# Patient Record
Sex: MALE | Race: WHITE | NOT HISPANIC OR LATINO | Employment: FULL TIME | URBAN - METROPOLITAN AREA
[De-identification: names, ages, dates, MRNs, and addresses within clinical notes are randomized per-mention and may not be internally consistent; named-entity substitution may affect disease eponyms.]

---

## 2017-05-12 ENCOUNTER — COMMUNICATION - RIVER FALLS (OUTPATIENT)
Dept: FAMILY MEDICINE | Facility: CLINIC | Age: 57
End: 2017-05-12

## 2017-05-12 ENCOUNTER — OFFICE VISIT - RIVER FALLS (OUTPATIENT)
Dept: FAMILY MEDICINE | Facility: CLINIC | Age: 57
End: 2017-05-12

## 2017-05-12 ASSESSMENT — MIFFLIN-ST. JEOR: SCORE: 2189.27

## 2017-05-13 LAB
CHOLEST SERPL-MCNC: 202 MG/DL (ref 125–200)
CHOLEST/HDLC SERPL: 5.2 {RATIO}
GLUCOSE BLD-MCNC: 141 MG/DL (ref 65–99)
HBA1C MFR BLD: 7.6 %
HDLC SERPL-MCNC: 39 MG/DL
LDLC SERPL CALC-MCNC: 105 MG/DL
NONHDLC SERPL-MCNC: 163 MG/DL
TRIGL SERPL-MCNC: 291 MG/DL

## 2018-07-11 ENCOUNTER — OFFICE VISIT - RIVER FALLS (OUTPATIENT)
Dept: FAMILY MEDICINE | Facility: CLINIC | Age: 58
End: 2018-07-11

## 2018-07-11 ASSESSMENT — MIFFLIN-ST. JEOR: SCORE: 2062.27

## 2018-07-12 LAB
CHOLEST SERPL-MCNC: 229 MG/DL
CHOLEST/HDLC SERPL: 6 {RATIO}
CREAT SERPL-MCNC: 1 MG/DL (ref 0.7–1.33)
GLUCOSE BLD-MCNC: 252 MG/DL (ref 65–99)
HBA1C MFR BLD: 12.1 %
HDLC SERPL-MCNC: 38 MG/DL
LDLC SERPL CALC-MCNC: 106 MG/DL
LDLC SERPL CALC-MCNC: ABNORMAL MG/DL
NONHDLC SERPL-MCNC: 191 MG/DL
TRIGL SERPL-MCNC: 541 MG/DL

## 2018-08-13 ENCOUNTER — OFFICE VISIT - RIVER FALLS (OUTPATIENT)
Dept: FAMILY MEDICINE | Facility: CLINIC | Age: 58
End: 2018-08-13

## 2018-08-13 ASSESSMENT — MIFFLIN-ST. JEOR: SCORE: 2090.39

## 2018-09-12 ENCOUNTER — OFFICE VISIT - RIVER FALLS (OUTPATIENT)
Dept: FAMILY MEDICINE | Facility: CLINIC | Age: 58
End: 2018-09-12

## 2018-09-13 LAB
CREAT SERPL-MCNC: 1 MG/DL (ref 0.7–1.33)
GLUCOSE BLD-MCNC: 119 MG/DL (ref 65–99)
HBA1C MFR BLD: 7.6 %

## 2018-12-13 ENCOUNTER — OFFICE VISIT - RIVER FALLS (OUTPATIENT)
Dept: FAMILY MEDICINE | Facility: CLINIC | Age: 58
End: 2018-12-13

## 2018-12-13 ASSESSMENT — MIFFLIN-ST. JEOR: SCORE: 1987.88

## 2018-12-14 LAB — HBA1C MFR BLD: 6 %

## 2019-06-04 ENCOUNTER — OFFICE VISIT - RIVER FALLS (OUTPATIENT)
Dept: FAMILY MEDICINE | Facility: CLINIC | Age: 59
End: 2019-06-04

## 2019-06-04 ASSESSMENT — MIFFLIN-ST. JEOR: SCORE: 1995.13

## 2019-06-05 ENCOUNTER — COMMUNICATION - RIVER FALLS (OUTPATIENT)
Dept: FAMILY MEDICINE | Facility: CLINIC | Age: 59
End: 2019-06-05

## 2019-06-05 LAB — HBA1C MFR BLD: 5.8 %

## 2019-12-28 ENCOUNTER — OFFICE VISIT - RIVER FALLS (OUTPATIENT)
Dept: FAMILY MEDICINE | Facility: CLINIC | Age: 59
End: 2019-12-28

## 2019-12-28 ASSESSMENT — MIFFLIN-ST. JEOR: SCORE: 2061.36

## 2020-02-20 ENCOUNTER — COMMUNICATION - RIVER FALLS (OUTPATIENT)
Dept: FAMILY MEDICINE | Facility: CLINIC | Age: 60
End: 2020-02-20

## 2020-02-26 ENCOUNTER — OFFICE VISIT - RIVER FALLS (OUTPATIENT)
Dept: FAMILY MEDICINE | Facility: CLINIC | Age: 60
End: 2020-02-26

## 2020-02-26 ASSESSMENT — MIFFLIN-ST. JEOR: SCORE: 2055.01

## 2020-02-27 ENCOUNTER — COMMUNICATION - RIVER FALLS (OUTPATIENT)
Dept: FAMILY MEDICINE | Facility: CLINIC | Age: 60
End: 2020-02-27

## 2020-02-27 LAB
BUN SERPL-MCNC: 14 MG/DL (ref 7–25)
BUN/CREAT RATIO - HISTORICAL: NORMAL (ref 6–22)
CALCIUM SERPL-MCNC: 9.4 MG/DL (ref 8.6–10.3)
CHLORIDE BLD-SCNC: 103 MMOL/L (ref 98–110)
CHOLEST SERPL-MCNC: 177 MG/DL
CHOLEST/HDLC SERPL: 3.8 {RATIO}
CO2 SERPL-SCNC: 29 MMOL/L (ref 20–32)
CREAT SERPL-MCNC: 1.09 MG/DL (ref 0.7–1.33)
CREAT UR-MCNC: 147 MG/DL (ref 20–320)
EGFRCR SERPLBLD CKD-EPI 2021: 74 ML/MIN/1.73M2
GLUCOSE BLD-MCNC: 99 MG/DL (ref 65–99)
HBA1C MFR BLD: 6.1 %
HDLC SERPL-MCNC: 46 MG/DL
LDLC SERPL CALC-MCNC: 99 MG/DL
MICROALBUMIN UR-MCNC: 0.8 MG/DL
MICROALBUMIN/CREAT UR: 5 MG/G{CREAT}
NONHDLC SERPL-MCNC: 131 MG/DL
POTASSIUM BLD-SCNC: 4 MMOL/L (ref 3.5–5.3)
SODIUM SERPL-SCNC: 140 MMOL/L (ref 135–146)
TRIGL SERPL-MCNC: 208 MG/DL

## 2020-03-03 ENCOUNTER — COMMUNICATION - RIVER FALLS (OUTPATIENT)
Dept: FAMILY MEDICINE | Facility: CLINIC | Age: 60
End: 2020-03-03

## 2020-08-14 ENCOUNTER — OFFICE VISIT - RIVER FALLS (OUTPATIENT)
Dept: FAMILY MEDICINE | Facility: CLINIC | Age: 60
End: 2020-08-14

## 2020-08-14 ASSESSMENT — MIFFLIN-ST. JEOR: SCORE: 2083.58

## 2020-08-15 LAB — HBA1C MFR BLD: 6.1 %

## 2020-08-16 ENCOUNTER — COMMUNICATION - RIVER FALLS (OUTPATIENT)
Dept: FAMILY MEDICINE | Facility: CLINIC | Age: 60
End: 2020-08-16

## 2021-02-18 ENCOUNTER — OFFICE VISIT - RIVER FALLS (OUTPATIENT)
Dept: FAMILY MEDICINE | Facility: CLINIC | Age: 61
End: 2021-02-18

## 2021-02-18 ENCOUNTER — AMBULATORY - RIVER FALLS (OUTPATIENT)
Dept: FAMILY MEDICINE | Facility: CLINIC | Age: 61
End: 2021-02-18

## 2021-02-18 ASSESSMENT — MIFFLIN-ST. JEOR: SCORE: 2144.82

## 2021-02-19 ENCOUNTER — COMMUNICATION - RIVER FALLS (OUTPATIENT)
Dept: FAMILY MEDICINE | Facility: CLINIC | Age: 61
End: 2021-02-19

## 2021-02-19 LAB
BUN SERPL-MCNC: 16 MG/DL (ref 7–25)
BUN/CREAT RATIO - HISTORICAL: ABNORMAL (ref 6–22)
CALCIUM SERPL-MCNC: 9.4 MG/DL (ref 8.6–10.3)
CHLORIDE BLD-SCNC: 106 MMOL/L (ref 98–110)
CHOLEST SERPL-MCNC: 177 MG/DL
CHOLEST/HDLC SERPL: 3.6 {RATIO}
CO2 SERPL-SCNC: 25 MMOL/L (ref 20–32)
CREAT SERPL-MCNC: 1.13 MG/DL (ref 0.7–1.25)
CREAT UR-MCNC: 159 MG/DL (ref 20–320)
EGFRCR SERPLBLD CKD-EPI 2021: 70 ML/MIN/1.73M2
GLUCOSE BLD-MCNC: 141 MG/DL (ref 65–99)
HBA1C MFR BLD: 6.7 %
HDLC SERPL-MCNC: 49 MG/DL
LDLC SERPL CALC-MCNC: 102 MG/DL
MICROALBUMIN UR-MCNC: 0.8 MG/DL
MICROALBUMIN/CREAT UR: 5 MG/G{CREAT}
NONHDLC SERPL-MCNC: 128 MG/DL
POTASSIUM BLD-SCNC: 4.3 MMOL/L (ref 3.5–5.3)
SODIUM SERPL-SCNC: 141 MMOL/L (ref 135–146)
TRIGL SERPL-MCNC: 164 MG/DL

## 2021-12-21 ENCOUNTER — OFFICE VISIT - RIVER FALLS (OUTPATIENT)
Dept: FAMILY MEDICINE | Facility: CLINIC | Age: 61
End: 2021-12-21

## 2021-12-22 ENCOUNTER — COMMUNICATION - RIVER FALLS (OUTPATIENT)
Dept: FAMILY MEDICINE | Facility: CLINIC | Age: 61
End: 2021-12-22

## 2021-12-22 LAB
BUN SERPL-MCNC: 16 MG/DL (ref 7–25)
BUN/CREAT RATIO - HISTORICAL: ABNORMAL (ref 6–22)
CALCIUM SERPL-MCNC: 9.6 MG/DL (ref 8.6–10.3)
CHLORIDE BLD-SCNC: 104 MMOL/L (ref 98–110)
CHOLEST SERPL-MCNC: 193 MG/DL
CHOLEST/HDLC SERPL: 4.2 {RATIO}
CO2 SERPL-SCNC: 27 MMOL/L (ref 20–32)
CREAT SERPL-MCNC: 1.12 MG/DL (ref 0.7–1.25)
CREAT UR-MCNC: 168 MG/DL (ref 20–320)
EGFRCR SERPLBLD CKD-EPI 2021: 71 ML/MIN/1.73M2
GLUCOSE BLD-MCNC: 135 MG/DL (ref 65–99)
HBA1C MFR BLD: 6.7 %
HDLC SERPL-MCNC: 46 MG/DL
LDLC SERPL CALC-MCNC: 107 MG/DL
MICROALBUMIN UR-MCNC: 0.9 MG/DL
MICROALBUMIN/CREAT UR: 5 MG/G{CREAT}
NONHDLC SERPL-MCNC: 147 MG/DL
POTASSIUM BLD-SCNC: 4.5 MMOL/L (ref 3.5–5.3)
SODIUM SERPL-SCNC: 141 MMOL/L (ref 135–146)
TRIGL SERPL-MCNC: 281 MG/DL
VIT B12 SERPL-MCNC: 317 PG/ML (ref 200–1100)

## 2022-01-01 ENCOUNTER — TRANSFERRED RECORDS (OUTPATIENT)
Dept: MULTI SPECIALTY CLINIC | Facility: CLINIC | Age: 62
End: 2022-01-01

## 2022-01-01 LAB — RETINOPATHY: NORMAL

## 2022-02-11 VITALS
DIASTOLIC BLOOD PRESSURE: 81 MMHG | TEMPERATURE: 97.9 F | HEIGHT: 76 IN | HEART RATE: 76 BPM | WEIGHT: 259.5 LBS | SYSTOLIC BLOOD PRESSURE: 119 MMHG | BODY MASS INDEX: 31.6 KG/M2

## 2022-02-12 VITALS
TEMPERATURE: 97.5 F | BODY MASS INDEX: 31.78 KG/M2 | DIASTOLIC BLOOD PRESSURE: 82 MMHG | DIASTOLIC BLOOD PRESSURE: 84 MMHG | WEIGHT: 261 LBS | WEIGHT: 254.8 LBS | SYSTOLIC BLOOD PRESSURE: 135 MMHG | HEART RATE: 84 BPM | BODY MASS INDEX: 31.03 KG/M2 | HEIGHT: 76 IN | SYSTOLIC BLOOD PRESSURE: 120 MMHG | HEIGHT: 76 IN

## 2022-02-12 VITALS
TEMPERATURE: 97.6 F | WEIGHT: 273 LBS | HEART RATE: 75 BPM | SYSTOLIC BLOOD PRESSURE: 122 MMHG | DIASTOLIC BLOOD PRESSURE: 77 MMHG | HEIGHT: 76 IN | BODY MASS INDEX: 33.24 KG/M2

## 2022-02-12 VITALS
SYSTOLIC BLOOD PRESSURE: 120 MMHG | HEIGHT: 76 IN | WEIGHT: 254.6 LBS | BODY MASS INDEX: 31 KG/M2 | TEMPERATURE: 97.7 F | DIASTOLIC BLOOD PRESSURE: 68 MMHG | OXYGEN SATURATION: 93 % | HEART RATE: 84 BPM

## 2022-02-12 VITALS
HEIGHT: 76 IN | BODY MASS INDEX: 34.44 KG/M2 | SYSTOLIC BLOOD PRESSURE: 133 MMHG | TEMPERATURE: 98.2 F | DIASTOLIC BLOOD PRESSURE: 84 MMHG | WEIGHT: 282.8 LBS | HEART RATE: 76 BPM

## 2022-02-12 VITALS
WEIGHT: 240 LBS | SYSTOLIC BLOOD PRESSURE: 124 MMHG | HEART RATE: 76 BPM | BODY MASS INDEX: 29.22 KG/M2 | DIASTOLIC BLOOD PRESSURE: 78 MMHG | TEMPERATURE: 98.1 F | HEIGHT: 76 IN

## 2022-02-12 VITALS
DIASTOLIC BLOOD PRESSURE: 70 MMHG | SYSTOLIC BLOOD PRESSURE: 120 MMHG | BODY MASS INDEX: 33.23 KG/M2 | TEMPERATURE: 97.9 F | WEIGHT: 273 LBS | HEART RATE: 80 BPM

## 2022-02-12 VITALS
TEMPERATURE: 97.6 F | WEIGHT: 238.4 LBS | SYSTOLIC BLOOD PRESSURE: 124 MMHG | HEIGHT: 76 IN | BODY MASS INDEX: 29.03 KG/M2 | DIASTOLIC BLOOD PRESSURE: 77 MMHG | HEART RATE: 76 BPM

## 2022-02-12 VITALS
HEART RATE: 80 BPM | BODY MASS INDEX: 30.83 KG/M2 | HEIGHT: 76 IN | DIASTOLIC BLOOD PRESSURE: 82 MMHG | WEIGHT: 253.2 LBS | TEMPERATURE: 97.2 F | SYSTOLIC BLOOD PRESSURE: 135 MMHG

## 2022-02-12 VITALS
TEMPERATURE: 97.3 F | HEART RATE: 79 BPM | DIASTOLIC BLOOD PRESSURE: 79 MMHG | SYSTOLIC BLOOD PRESSURE: 136 MMHG | BODY MASS INDEX: 31.26 KG/M2 | WEIGHT: 256.8 LBS

## 2022-02-15 NOTE — NURSING NOTE
Comprehensive Intake Entered On:  6/4/2019 4:47 PM CDT    Performed On:  6/4/2019 4:46 PM CDT by Sandra Nice               Summary   Chief Complaint :   DM/HTN check    Weight Measured :   240.0 lb(Converted to: 240 lb 0 oz, 108.86 kg)    Height Measured :   76 in(Converted to: 6 ft 4 in, 193.04 cm)    Body Mass Index :   29.21 kg/m2 (HI)    Body Surface Area :   2.41 m2   Systolic Blood Pressure :   124 mmHg   Diastolic Blood Pressure :   78 mmHg   Mean Arterial Pressure :   93 mmHg   Peripheral Pulse Rate :   76 bpm   BP Method :   Electronic   HR Method :   Electronic   Temperature Tympanic :   98.1 DegF(Converted to: 36.7 DegC)    Sandra Nice - 6/4/2019 4:46 PM CDT   Health Status   Allergies Verified? :   Yes   Medication History Verified? :   Yes   Pre-Visit Planning Status :   Completed   Tobacco Use? :   Never smoker   Sandra Nice - 6/4/2019 4:46 PM CDT   Meds / Allergies   (As Of: 6/4/2019 4:47:15 PM CDT)   Allergies (Active)   Dymatap  Estimated Onset Date:   Unspecified ; Reactions:   skin ulcer ; Created By:   Sandra Juan CMA; Reaction Status:   Active ; Category:   Drug ; Substance:   Dymatap ; Type:   Allergy ; Updated By:   Sandra Juan CMA; Reviewed Date:   9/12/2018 8:04 AM CDT        Medication List   (As Of: 6/4/2019 4:47:15 PM CDT)   Prescription/Discharge Order    atorvastatin  :   atorvastatin ; Status:   Prescribed ; Ordered As Mnemonic:   atorvastatin 80 mg oral tablet ; Simple Display Line:   80 mg, 1 tab(s), po, daily, 90 tab(s), 0 Refill(s) ; Ordering Provider:   Marcus Wolff MD; Catalog Code:   atorvastatin ; Order Dt/Tm:   5/31/2019 11:45:22 AM          losartan  :   losartan ; Status:   Prescribed ; Ordered As Mnemonic:   losartan 25 mg oral tablet ; Simple Display Line:   25 mg, 1 tab(s), PO, Daily, 90 tab(s), 0 Refill(s) ; Ordering Provider:   Marcus Wolff MD; Catalog Code:   losartan ; Order Dt/Tm:   5/31/2019 11:45:21 AM          metFORMIN  :    metFORMIN ; Status:   Prescribed ; Ordered As Mnemonic:   metFORMIN 500 mg oral tablet ; Simple Display Line:   500 mg, 1 tab(s), PO, BID, 180 tab(s), 0 Refill(s) ; Ordering Provider:   Marcus Wolff MD; Catalog Code:   metFORMIN ; Order Dt/Tm:   5/31/2019 11:45:21 AM          Miscellaneous Rx Supply  :   Miscellaneous Rx Supply ; Status:   Prescribed ; Ordered As Mnemonic:   one touch verio test strips ; Simple Display Line:   See Instructions, Test 3x/ day, 300 EA, 3 Refill(s) ; Ordering Provider:   Marcus Wolff MD; Catalog Code:   Miscellaneous Rx Supply ; Order Dt/Tm:   12/13/2018 8:33:34 AM          Miscellaneous Rx Supply  :   Miscellaneous Rx Supply ; Status:   Completed ; Ordered As Mnemonic:   CPAP Supplies ; Simple Display Line:   See Instructions, heated humidifier, heated tubing, filters, nasal or full face mask of choice with headgear.  Replacement cushions and supplies as needed.   Months=99 (lifetime), 1 EA, 0 Refill(s) ; Ordering Provider:   Marcus Wolff MD; Catalog Code:   Miscellaneous Rx Supply ; Order Dt/Tm:   12/13/2018 8:28:41 AM

## 2022-02-15 NOTE — LETTER
(Inserted Image. Unable to display)   February 27, 2020      ROSA MAY       [address] [place], [place] [id number]        Dear ROSA,    Thank you for selecting [facility] for your healthcare needs.  Below you will find the results of the recent tests done at our clinic.     All labs acceptable.      Result Name Current Result Previous Result Reference Range   Potassium Level (mmol/L)  4.0 2/26/2020  4.2 9/12/2018 3.5 - 5.3   Glucose Level (mg/dL)  99 2/26/2020 ((H)) 119 9/12/2018 65 - 99   Creatinine Level (mg/dL)  1.09 2/26/2020  1.00 9/12/2018 0.70 - 1.33   Hgb A1c ((H)) 6.1 2/26/2020 ((H)) 5.8 6/4/2019  - <5.7   Cholesterol (mg/dL)  177 2/26/2020 ((H)) 229 7/11/2018  - <200   HDL (mg/dL)  46 2/26/2020 ((L)) 38 7/11/2018 > OR = 40 -    LDL  99 2/26/2020  See comment 7/11/2018    Triglyceride (mg/dL) ((H)) 208 2/26/2020 ((H)) 541 7/11/2018  - <150   Ur Microalbumin/Creatinine Ratio  5 2/26/2020  14 7/11/2018  - <30       Please contact me or my assistant at [phone] if you have any questions.     Sincerely,        Marcus Wolff MD        What do your labs mean?  Below is a glossary of commonly ordered labs:  LDL   Bad Cholesterol   HDL   Good Cholesterol  AST/ALT   Liver Function   Cr/Creatinine   Kidney Function  Microalbumin   Kidney Function  BUN   Kidney Function  PSA   Prostate    TSH   Thyroid Hormone  HgbA1c   Diabetes Test   Hgb (Hemoglobin)   Red Blood Cells

## 2022-02-15 NOTE — PROGRESS NOTES
Patient:   ROSA MAY            MRN: [id number]            FIN: [id number]               Age:   58 years     Sex:  Male     :  1960   Associated Diagnoses:   Dyslipidemia; HTN (hypertension); Type 2 diabetes mellitus   Author:   Marcus Wolff MD      Visit Information      Date of Service: 2018 07:39 am  Performing Location: [facility]  Encounter#: [id number]      Primary Care Provider (PCP):  Marcus Wolff MD    NPI# [id number]      Chief Complaint   2018 7:51 AM CDT    DM check up.        History of Present Illness   Doing well  Tolerating meds well  Ave glucose 127  needs eye check      Review of Systems   Constitutional:  Negative.    Eye:  Negative.    Ear/Nose/Mouth/Throat:  Negative.    Respiratory:  Negative.    Cardiovascular:  Negative.    Gastrointestinal:  Negative.    Genitourinary:  Negative.    Hematology/Lymphatics:  Negative.    Endocrine:  Negative.    Immunologic:  Negative.    Musculoskeletal:  Negative.    Integumentary:  Negative.    Neurologic:  Negative.       Health Status   Allergies:    Allergic Reactions (Selected)  Severity Not Documented  Dymatap (Skin ulcer)   Medications:  (Selected)   Prescriptions  Prescribed  CPAP Supplies: CPAP Supplies, See Instructions, Instructions: heated humidifier, heated tubing, filters, nasal or full face mask of choice with headgear.  Replacement cushions and supplies as needed.   Months=99 (lifetime), Supply, # 1 EA, 0 Refill(s), Type: Mainten...  atorvastatin 80 mg oral tablet: 1 tab(s) ( 80 mg ), po, daily, # 90 tab(s), 1 Refill(s), Type: Maintenance, Pharmacy: [facility], 1 tab(s) Oral daily  losartan 25 mg oral tablet: 1 tab(s) ( 25 mg ), PO, Daily, # 90 tab(s), 0 Refill(s), Type: Maintenance, Pharmacy: [facility], 1 tab(s) Oral daily  metFORMIN 500 mg oral tablet: 1 tab(s) ( 500 mg ), PO, BID, # 180 tab(s), 1 Refill(s), Type: Maintenance, [facility]: [facility], 1 tab(s) Oral bid  one touch verio test strips: one touch verio test strips, See Instructions, Instructions: Test 3x/ day, Supply, # 300 EA, 3 Refill(s), Type: Maintenance, Pharmacy: [facility], Test 3x/ day,    Medications          *denotes recorded medication          CPAP Supplies: See Instructions, heated humidifier, heated tubing, filters, nasal or full face mask of choice with headgear.  Replacement cushions and supplies as needed.   Months=99 (lifetime), 1 EA, 0 Refill(s).          one touch verio test strips: See Instructions, Test 3x/ day, 300 EA, 3 Refill(s).          atorvastatin 80 mg oral tablet: 80 mg, 1 tab(s), po, daily, 90 tab(s), 1 Refill(s).          losartan 25 mg oral tablet: 25 mg, 1 tab(s), PO, Daily, 90 tab(s), 0 Refill(s).          metFORMIN 500 mg oral tablet: 500 mg, 1 tab(s), PO, BID, 180 tab(s), 1 Refill(s).     Problem list:    All Problems (Selected)  Dyslipidemia / SNOMED CT 0646191587 / Confirmed  Gout / SNOMED CT 115017304 / Confirmed  HTN (hypertension) / SNOMED CT 2355607238 / Confirmed  Obesity / SNOMED CT 4013789303 / Probable  Seasonal allergies / SNOMED CT 455346644 / Confirmed  Type 2 diabetes mellitus / SNOMED CT 680713074 / Confirmed      Histories   Past Medical History:    Active  Seasonal allergies ([id number])   Family History:    Diabetes mellitus type I  Son (Daniel)  Stroke  Mother (Mariela)  Autism  Son (Daniel)  Bladder cancer..  Father (Thierno)     Procedure history:    No active procedure history items have been selected or recorded.   Social History:        Alcohol Assessment            Current, 6 beers per year      Tobacco Assessment            Never      Substance Abuse Assessment            Never      Employment and Education Assessment            Employed, Work/School description: Maintance Supervisor.      Home and Environment Assessment            Marital status: .  Spouse/Partner name: Rhoda Kendall.       Nutrition and Health Assessment            Type of diet: Regular.      Sexual Assessment            Sexually active: Yes.  Identifies as male, Sexual orientation: Straight or heterosexual.        Physical Examination   Vital Signs   9/12/2018 7:51 AM CDT Temperature Tympanic 97.3 DegF  LOW    Peripheral Pulse Rate 79 bpm    HR Method Electronic    Systolic Blood Pressure 136 mmHg  HI    Diastolic Blood Pressure 79 mmHg    Mean Arterial Pressure 98 mmHg    BP Method Electronic      Measurements from flowsheet : Measurements   9/12/2018 7:51 AM CDT    Weight Measured - Standard                256.8 lb     General:  Alert and oriented, No acute distress.    Neck:  Supple.    Respiratory:  Respirations are non-labored.    Cardiovascular:  Normal rate, Regular rhythm.    Neurologic:  Alert, Oriented.       Health Maintenance      Recommendations     Pending (in the next year)        OverDue           Alcohol Misuse Screen (Male) due  05/02/15  and every 1  year(s)           Depression Screen (Male) due  05/12/18  and every 1  year(s)        Due            Aspirin Therapy for Prevention of CVD (Male) due  09/12/18  and every 5  year(s)           Colorectal Cancer Screen (Colonoscopy) (Male) due  09/12/18  Variable frequency           Colorectal Cancer Screen (Occult Blood) (Male) due  09/12/18  Variable frequency           Colorectal Cancer Screen (Sigmoidoscopy) (Male) due  09/12/18  Variable frequency           DM - Communication with Managing Provider due  09/12/18  and every 1  year(s)           DM - Eye Exam due  09/12/18  and every 1  year(s)           Hepatitis C Screen [id number] (Male) due  09/12/18  One-time only           Influenza Vaccine due  09/12/18  and every 1  year(s)           Lung Cancer Screen (Male) due  09/12/18  and every 1  year(s)        Near Due            DM - HgbA1c near due  10/11/18  and every 3  month(s)        Due In Future            Tobacco Use Screen (Male) not due until  07/11/19  and  every 1  year(s)           DM - Microalbumin not due until  07/11/19  and every 1  year(s)           Lipid Disorders Screen (Male) not due until  07/11/19  and every 1  year(s)           DM - Foot Exam not due until  07/11/19  and every 1  year(s)           Body Mass Index Check (Male) not due until  08/13/19  and every 1  year(s)           High Blood Pressure Screen (Male) not due until  08/13/19  and every 1  year(s)           Obesity Screen and Counseling (Male) not due until  08/13/19  and every 1  year(s)     Satisfied (in the past 1 year)        Satisfied            Body Mass Index Check (Male) on  08/13/18.           Body Mass Index Check (Male) on  07/11/18.           DM - Foot Exam on  07/11/18.           DM - HgbA1c on  07/11/18.           DM - Microalbumin on  07/11/18.           DM - Microalbumin on  07/11/18.           High Blood Pressure Screen (Male) on  08/13/18.           High Blood Pressure Screen (Male) on  07/11/18.           Lipid Disorders Screen (Male) on  07/11/18.           Lipid Disorders Screen (Male) on  07/11/18.           Lipid Disorders Screen (Male) on  07/11/18.           Lipid Disorders Screen (Male) on  07/11/18.           Lipid Disorders Screen (Male) on  07/11/18.           Obesity Screen and Counseling (Male) on  08/13/18.           Obesity Screen and Counseling (Male) on  07/11/18.           Tobacco Use Screen (Male) on  07/11/18.        Review / Management   Results review:  Lab results   7/11/2018 8:46 AM CDT Sodium Level 138 mmol/L    Potassium Level 4.4 mmol/L    Chloride Level 101 mmol/L    CO2 Level 28 mmol/L    Glucose Level 252 mg/dL  HI    BUN 13 mg/dL    Creatinine Level 1.00 mg/dL    BUN/Creat Ratio NOT APPLICABLE    eGFR 83 mL/min/1.73m2    eGFR African American 96 mL/min/1.73m2    Calcium Level 9.3 mg/dL    Hgb A1c 12.1  HI    Cholesterol 229 mg/dL  HI    Non-HDL Cholesterol 191  HI    HDL 38 mg/dL  LOW    Cholesterol/HDL Ratio 6.0  HI    LDL See comment    LDL  Direct 106 mg/dL  HI    Triglyceride 541 mg/dL  HI    U Microalbumin 3.6 mg/dL    Ur Creatinine 264 mg/dL    Ur Microalbumin/Creatinine Ratio 14   .       Impression and Plan   Diagnosis     Dyslipidemia (YDF96-LI E78.5).     HTN (hypertension) (EHT95-WV I10).     Type 2 diabetes mellitus (LMR85-ZW E11.9).     Course:  Improving.    Plan:  recheck labs  consider increase losartan and metform next visit  fu 3 mo.    Patient Instructions:       Counseled: Patient, Regarding diagnosis, Regarding treatment, Regarding medications, Diet, Activity.

## 2022-02-15 NOTE — TELEPHONE ENCOUNTER
Entered by Sandra Nice on February 10, 2021 2:54:12 PM CST  ---------------------  From: Sandra Nice   To: [facility]    Sent: 2/10/2021 2:54:12 PM CST  Subject: Medication Management     ** Submitted: **  Order:metFORMIN (metFORMIN 500 mg oral tablet)  1 tab(s)  Oral  bid  Qty:  180 tab(s)        Days Supply:  0        Refills:  0          Substitutions Allowed     Route To Pharmacy - [facility]    Signed by Sandra Nice  2/10/2021 8:53:00 PM UT    ** Submitted: **  Complete:metFORMIN (metFORMIN 500 mg oral tablet)   Signed by Sandra Nice  2/10/2021 8:54:00 PM UT    ** Not Approved:  **  metFORMIN (METFORMIN HCL TABS 500MG)  TAKE 1 TABLET TWICE A DAY  Qty:  180 tab(s)        Days Supply:  0        Refills:  3          Substitutions Allowed     Route To Pharmacy - [facility]   Signed by Sandra Nice            ** Submitted: **  Order:atorvastatin (atorvastatin 80 mg oral tablet)  1 tab(s)  Oral  daily  Qty:  90 tab(s)        Days Supply:  0        Refills:  0          Substitutions Allowed     Route To Pharmacy - [facility]    Signed by Sandra Nice  2/10/2021 8:53:00 PM UT    ** Submitted: **  Complete:atorvastatin (atorvastatin 80 mg oral tablet)   Signed by Sandra Nice  2/10/2021 8:53:00 PM UT    ** Not Approved:  **  atorvastatin (ATORVASTATIN TABS 80MG)  TAKE 1 TABLET DAILY  Qty:  90 tab(s)        Days Supply:  0        Refills:  3          Substitutions Allowed     Route To Pharmacy - [facility]   Signed by Sandra Nice          Med Refill      Date of last office visit and reason:  CDV 8/14/29 with TFS      Date of last Med Check / Px:   _  Date of last labs pertaining to med:  A1c 8/14/20, Lipid 2/26/20    RTC order in chart:  yes, due this month.  Spoke with patients wife, who will relay message to patient.  Rx's sent.     For Protocol refill, has patient been  contacted:  _    Medication filled or not filled per clinic policy.       ------------------------------------------  From: [facility]  To: Marcus Wolff MD  Sent: February 9, 2021 11:54:23 PM CST  Subject: Medication Management  Due: January 30, 2021 4:21:54 PM CST     ** On Hold Pending Signature **     Drug: metFORMIN (metFORMIN 500 mg oral tablet), TAKE 1 TABLET TWICE A DAY  Quantity: 180 tab(s)  Days Supply: 0  Refills: 3  Substitutions Allowed  Notes from Pharmacy:     Dispensed Drug: metFORMIN (metFORMIN 500 mg oral tablet), TAKE 1 TABLET TWICE A DAY  Quantity: 180 tab(s)  Days Supply: 0  Refills: 3  Substitutions Allowed  Notes from Pharmacy:     ** On Hold Pending Signature **     Drug: atorvastatin (atorvastatin 80 mg oral tablet), TAKE 1 TABLET DAILY  Quantity: 90 tab(s)  Days Supply: 0  Refills: 3  Substitutions Allowed  Notes from Pharmacy:     Dispensed Drug: atorvastatin (atorvastatin 80 mg oral tablet), TAKE 1 TABLET DAILY  Quantity: 90 tab(s)  Days Supply: 0  Refills: 3  Substitutions Allowed  Notes from Pharmacy:  ------------------------------------------

## 2022-02-15 NOTE — PROGRESS NOTES
Patient:   ROSA MAY            MRN: [id number]            FIN: [id number]               Age:   58 years     Sex:  Male     :  1960   Associated Diagnoses:   Dyslipidemia; HTN (hypertension); Type 2 diabetes mellitus   Author:   Marcus Wolff MD      Visit Information      Date of Service: 2019 04:39 pm  Performing Location: [facility]  Encounter#: [id number]      Primary Care Provider (PCP):  Marcus Wolff MD    NPI# [id number]      Referring Provider:  Marcus Wolff MD# [id number]      Chief Complaint   2019 4:46 PM CDT     DM/HTN check        History of Present Illness   Doing well  Tolerating meds well  Ave glucose 120  40lb weight loss has held  eye check up to date      Review of Systems   Constitutional:  Negative.    Eye:  Negative.    Ear/Nose/Mouth/Throat:  Negative.    Respiratory:  Negative.    Cardiovascular:  Negative.    Gastrointestinal:  Negative.    Genitourinary:  Negative.    Hematology/Lymphatics:  Negative.    Endocrine:  Negative.    Immunologic:  Negative.    Musculoskeletal:  Negative.    Integumentary:  Negative.    Neurologic:  Negative.       Health Status   Allergies:    Allergic Reactions (Selected)  Severity Not Documented  Dymatap (Skin ulcer)   Medications:  (Selected)   Prescriptions  Prescribed  CPAP Machine: CPAP Machine, See Instructions, Instructions: Use as directed, Supply, # 1 EA, 0 Refill(s), Type: Maintenance  Replacement CPAP +16cm H2o: Replacement CPAP +16cm H2o, See Instructions, Instructions: Heated humidifier, heated tubing, filters, nasal or full face mask of choice with headgear.  Replacement cushions and supplies as needed.  Months = 99 (Lifetime), Supply, # 1 EA, 0 Refill(s),...  atorvastatin 80 mg oral tablet: = 1 tab(s) ( 80 mg ), po, daily, # 90 tab(s), 0 Refill(s), Type: Maintenance, Pharmacy: [facility], 1 tab(s) Oral daily  losartan 25 mg oral tablet: = 1 tab(s) ( 25 mg ), PO,  Daily, # 90 tab(s), 0 Refill(s), Type: Maintenance, Pharmacy: [facility], 1 tab(s) Oral daily  metFORMIN 500 mg oral tablet: = 1 tab(s) ( 500 mg ), PO, BID, # 180 tab(s), 0 Refill(s), Type: Maintenance, Pharmacy: [facility], 1 tab(s) Oral bid  one touch verio test strips: one touch verio test strips, See Instructions, Instructions: Test 3x/ day, Supply, # 300 EA, 3 Refill(s), Type: Maintenance, Pharmacy: [facility], Test 3x/ day,    Medications          *denotes recorded medication          one touch verio test strips: See Instructions, Test 3x/ day, 300 EA, 3 Refill(s).          Replacement CPAP +16cm H2o: See Instructions, Heated humidifier, heated tubing, filters, nasal or full face mask of choice with headgear.  Replacement cushions and supplies as needed.  Months = 99 (Lifetime), 1 EA, 0 Refill(s).          CPAP Machine: See Instructions, Use as directed, 1 EA, 0 Refill(s).          atorvastatin 80 mg oral tablet: 80 mg, 1 tab(s), po, daily, 90 tab(s), 0 Refill(s).          losartan 25 mg oral tablet: 25 mg, 1 tab(s), PO, Daily, 90 tab(s), 0 Refill(s).          metFORMIN 500 mg oral tablet: 500 mg, 1 tab(s), PO, BID, 180 tab(s), 0 Refill(s).     Problem list:    All Problems  Dyslipidemia / SNOMED CT 2676062745 / Confirmed  Gout / SNOMED CT 601118624 / Confirmed  HTN (hypertension) / SNOMED CT 3483059527 / Confirmed  Obesity / SNOMED CT 7419792361 / Probable  Seasonal allergies / SNOMED CT 578679241 / Confirmed  Type 2 diabetes mellitus / SNOMED CT 843002659 / Confirmed  Canceled: Diabetes / SNOMED CT 190979405  Canceled: Prediabetes / SNOMED CT 53067288      Histories   Past Medical History:    Active  Seasonal allergies ([id number])   Family History:    Diabetes mellitus type I  Son (Daniel)  Stroke  Mother (Mariela)  Autism  Son (Daniel)  Bladder cancer..  Father (Thierno)     Procedure history:    No active procedure history items have been selected  or recorded.   Social History:        Alcohol Assessment            Current, 6 beers per year      Tobacco Assessment            Never      Substance Abuse Assessment            Never      Employment and Education Assessment            Employed, Work/School description: Maintance Supervisor.      Home and Environment Assessment            Marital status: .  Spouse/Partner name: Rhoda Kendall.      Nutrition and Health Assessment            Type of diet: Regular.      Sexual Assessment            Sexually active: Yes.  Identifies as male, Sexual orientation: Straight or heterosexual.      Physical Examination   Vital Signs   6/4/2019 4:46 PM CDT Temperature Tympanic 98.1 DegF    Peripheral Pulse Rate 76 bpm    HR Method Electronic    Systolic Blood Pressure 124 mmHg    Diastolic Blood Pressure 78 mmHg    Mean Arterial Pressure 93 mmHg    BP Method Electronic      Measurements from flowsheet : Measurements   6/4/2019 4:46 PM CDT Height Measured - Standard 76 in    Weight Measured - Standard 240.0 lb    BSA 2.41 m2    Body Mass Index 29.21 kg/m2  HI      General:  Alert and oriented, No acute distress.    Neck:  Supple.    Respiratory:  Respirations are non-labored.    Cardiovascular:  Normal rate, Regular rhythm.    Neurologic:  Alert, Oriented.       Health Maintenance      Recommendations     Pending (in the next year)        OverDue           Alcohol Misuse Screen (Male) due  05/02/15  and every 1  year(s)           Depression Screen (Male) due  05/12/18  and every 1  year(s)           DM - HgbA1c due  03/13/19  and every 3  month(s)        Due            Aspirin Therapy for Prevention of CVD (Male) due  06/04/19  and every 5  year(s)           Colorectal Cancer Screen (Occult Blood) (Male) due  06/04/19  Variable frequency           Colorectal Cancer Screen (Colonoscopy) (Male) due  06/04/19  Variable frequency           Colorectal Cancer Screen (Sigmoidoscopy) (Male) due  06/04/19  Variable frequency            DM - Communication with Managing Provider due  06/04/19  and every 1  year(s)           DM - Eye Exam due  06/04/19  and every 1  year(s)           Hepatitis C Screen 9720-0768 (Male) due  06/04/19  One-time only           Lung Cancer Screen (Male) due  06/04/19  and every 1  year(s)        Near Due            DM - Microalbumin near due  07/11/19  and every 1  year(s)           Lipid Disorders Screen (Male) near due  07/11/19  and every 1  year(s)           DM - Foot Exam near due  07/11/19  and every 1  year(s)        Due In Future            Influenza Vaccine not due until  09/01/19  and every 1  year(s)           Type 2 Diabetes Mellitus Screen (Male) not due until  12/13/19  and every 1  year(s)           Tetanus Vaccine not due until  12/18/19  and every 10  year(s)     Satisfied (in the past 1 year)        Satisfied            Body Mass Index Check (Male) on  06/04/19.           Body Mass Index Check (Male) on  12/13/18.           Body Mass Index Check (Male) on  08/13/18.           Body Mass Index Check (Male) on  07/11/18.           DM - Foot Exam on  07/11/18.           DM - HgbA1c on  12/13/18.           DM - HgbA1c on  12/13/18.           DM - HgbA1c on  09/12/18.           DM - HgbA1c on  07/11/18.           DM - Microalbumin on  07/11/18.           DM - Microalbumin on  07/11/18.           High Blood Pressure Screen (Male) on  06/04/19.           High Blood Pressure Screen (Male) on  12/13/18.           High Blood Pressure Screen (Male) on  09/12/18.           High Blood Pressure Screen (Male) on  08/13/18.           High Blood Pressure Screen (Male) on  07/11/18.           Influenza Vaccine on  12/13/18.           Lipid Disorders Screen (Male) on  07/11/18.           Lipid Disorders Screen (Male) on  07/11/18.           Lipid Disorders Screen (Male) on  07/11/18.           Lipid Disorders Screen (Male) on  07/11/18.           Lipid Disorders Screen (Male) on  07/11/18.           Obesity Screen and  Counseling (Male) on  06/04/19.           Obesity Screen and Counseling (Male) on  12/13/18.           Obesity Screen and Counseling (Male) on  09/12/18.           Obesity Screen and Counseling (Male) on  08/13/18.           Obesity Screen and Counseling (Male) on  07/11/18.           Tobacco Use Screen (Male) on  06/04/19.           Tobacco Use Screen (Male) on  12/13/18.           Tobacco Use Screen (Male) on  09/12/18.           Tobacco Use Screen (Male) on  07/11/18.           Type 2 Diabetes Mellitus Screen (Male) on  12/13/18.           Type 2 Diabetes Mellitus Screen (Male) on  12/13/18.           Type 2 Diabetes Mellitus Screen (Male) on  09/12/18.           Type 2 Diabetes Mellitus Screen (Male) on  07/11/18.        Review / Management   Results review      Impression and Plan   Diagnosis     Dyslipidemia (DRF23-DL E78.5).     HTN (hypertension) (NXR37-CP I10).     Type 2 diabetes mellitus (OMW22-HZ E11.9).     Course:  Improving.    Plan:    fu 6 mo.    Patient Instructions:       Counseled: Patient, Regarding diagnosis, Regarding treatment, Regarding medications, Diet, Activity.

## 2022-02-15 NOTE — LETTER
(Inserted Image. Unable to display)   February 14, 2019      ROSA LALO   [address] [place], [place] [id number]        Dear ROSA,      Thank you for selecting [facility] (previously [facility]) for your healthcare needs.    Your Healthcare Provider has recommended that you schedule a diabetes management appointment with our Certified Diabetes Educator, Mira English RD, CD, CDE.     Please bring your glucose meter and your blood glucose diary to your appointment.    Available services include:  - Education about diabetes with guidance and support   - Education on the 7 Self Care Behaviors for Diabetes Management:     Healthy Eating   portion control, label readings, carbohydrate recommendations, heart healthy guidelines, meal planning    Being Active - Physical activity guidelines     Monitoring   blood glucose targets, evaluation of blood glucose readings and lab results    Taking Medication   medication management    Problem Solving   discuss any concerns/ questions     Healthy Coping   disease progression and management    Reducing Risks   prevention strategies to help avoid potential complications related to uncontrolled diabetes  - Weight loss strategies    To schedule an appointment or if you have further questions, please contact your primary clinic:   [facility]       [phone]   [facility]       [phone]              [facility]     [phone]      Powered by [employer] and [employer]    Sincerely,    Healthcare Providers at [facility]

## 2022-02-15 NOTE — PROGRESS NOTES
Patient:   ROSA MAY            MRN: [id number]            FIN: [id number]               Age:   60 years     Sex:  Male     :  1960   Associated Diagnoses:   Dyslipidemia; HTN (hypertension); Type 2 diabetes mellitus   Author:   Marcus Wolff MD      Visit Information      Date of Service: 2020 08:13 am  Performing Location: [facility]  Encounter#: [id number]      Primary Care Provider (PCP):  Marcus Wolff MD    NPI# [id number]      Referring Provider:  Marcus Wolff MD# [id number]      Chief Complaint   2020 8:16 AM CDT    chronic disease visit        History of Present Illness   Patient here for six-month follow-up on his hyperlipidemia hypertension and diabetes.  He is checking his sugars regularly at home.  His sugars are running slightly higher postmeal that 140s which is little higher than the 120s they had been.  He is been working from home which is not been the greatest.  Family is been in good health.  Eye checks are up-to-date.         Review of Systems   Constitutional:  Negative.    Eye:  Negative.    Ear/Nose/Mouth/Throat:  Negative.    Respiratory:  Negative.    Cardiovascular:  Negative.    Gastrointestinal:  Negative.    Genitourinary:  Negative.    Hematology/Lymphatics:  Negative.    Endocrine:  Negative.    Immunologic:  Negative.    Musculoskeletal:  Negative.    Integumentary:  Negative.    Neurologic:  Negative.       Health Status   Allergies:    Allergic Reactions (Selected)  Severity Not Documented  Dymatap (Skin ulcer)   Medications:  (Selected)   Prescriptions  Prescribed  atorvastatin 80 mg oral tablet: = 1 tab(s) ( 80 mg ), po, daily, # 90 tab(s), 1 Refill(s), Type: Maintenance, Pharmacy: [facility], 1 tab(s) Oral daily  losartan 25 mg oral tablet: = 1 tab(s), Oral, daily, # 30 tab(s), 0 Refill(s), Type: Maintenance, Pharmacy: [facility], Need appointment for further refills., 1 tab(s)  Oral daily, 76, in, 02/26/20 7:55:00 CST, Height Measured, Weight Measured  metFORMIN 500 mg oral tablet: = 1 tab(s) ( 500 mg ), PO, BID, # 180 tab(s), 1 Refill(s), Type: Maintenance, Pharmacy: [facility], 1 tab(s) Oral bid  one touch verio test strips: one touch verio test strips, See Instructions, Instructions: Test 3x/ day, Supply, # 300 EA, 3 Refill(s), Type: Maintenance, Pharmacy: [facility], Test 3x/ day,    Medications          *denotes recorded medication          one touch verio test strips: See Instructions, Test 3x/ day, 300 EA, 3 Refill(s).          atorvastatin 80 mg oral tablet: 80 mg, 1 tab(s), po, daily, 90 tab(s), 1 Refill(s).          losartan 25 mg oral tablet: 1 tab(s), Oral, daily, 30 tab(s), 0 Refill(s).          metFORMIN 500 mg oral tablet: 500 mg, 1 tab(s), PO, BID, 180 tab(s), 1 Refill(s).       Problem list:    All Problems (Selected)  Dyslipidemia / SNOMED CT 5420310920 / Confirmed  Gout / SNOMED CT 794442869 / Confirmed  HTN (hypertension) / SNOMED CT 6197162474 / Confirmed  Obesity / SNOMED CT 1340650395 / Probable  Seasonal allergies / SNOMED CT 820411364 / Confirmed  Type 2 diabetes mellitus / SNOMED CT 599264011 / Confirmed      Histories   Past Medical History:    Active  Seasonal allergies ([id number])   Family History:    Diabetes mellitus type I  Son (Daniel)  Stroke  Mother (Mariela)  Autism  Son (Daniel)  Bladder cancer..  Father (Thierno)     Procedure history:    Screening for colon cancer (SNOMED CT 2567649055) on 7/15/2019 at 59 Years.  Comments:  2/26/2020 9:50 AM [facility] - Sandra Niceogjason negative   Social History:        Alcohol Assessment            Current, Beer (12 oz), 1-2 times per year, 1 drinks/episode average.  Ready to change: No.      Tobacco Assessment            Never      Substance Abuse Assessment            Never      Employment and Education Assessment            Employed      Home and Environment  Assessment            Marital status: .  Spouse/Partner name: Rhoda Kendall.  2 children.  Living situation:               Home/Independent.  Injuries/Abuse/Neglect in household: No.  Feels unsafe at home: No.  Family/Friends               available for support: Yes.      Nutrition and Health Assessment            Type of diet: Low carbohydrate.  Wants to lose weight: Yes.  Sleeping concerns: No.  Feels highly stressed:               Yes.      Exercise and Physical Activity Assessment: Occasional exercise      Sexual Assessment            Sexually active: Yes.  Identifies as male, Sexual orientation: Straight or heterosexual.  History of STD: No.               History of sexual abuse: No.        Physical Examination   Vital Signs   8/14/2020 8:16 AM CDT Temperature Tympanic 97.9 DegF    Peripheral Pulse Rate 76 bpm    HR Method Electronic    Systolic Blood Pressure 119 mmHg    Diastolic Blood Pressure 81 mmHg  HI    Mean Arterial Pressure 94 mmHg    BP Method Electronic      Measurements from flowsheet : Measurements   8/14/2020 8:16 AM CDT Height Measured - Standard 76 in    Weight Measured - Standard 259.5 lb    BSA 2.51 m2    Body Mass Index 31.58 kg/m2  HI      General:  Alert and oriented, No acute distress.    HENT:  Tympanic membranes are clear.    Neck:  Supple, Non-tender, No carotid bruit, No lymphadenopathy, No thyromegaly.    Respiratory:  Lungs are clear to auscultation, Respirations are non-labored.    Cardiovascular:  Normal rate, Regular rhythm, No murmur, Normal peripheral perfusion, No edema.    Gastrointestinal:  Soft, Non-tender, No organomegaly.    Integumentary:  No rash.    Neurologic:  Alert, Oriented, No focal deficits, Cranial Nerves II-XII are grossly intact.       Health Maintenance      Recommendations     Pending (in the next year)        OverDue           DM - HgbA1c due  05/26/20  and every 3  month(s)        Due            Aspirin Therapy for Prevention of CVD (Male) due   08/14/20  and every 5  year(s)           Colorectal Cancer Screen (Colonoscopy) (Male) due  08/14/20  Variable frequency           Colorectal Cancer Screen (Occult Blood) (Male) due  08/14/20  Variable frequency           Colorectal Cancer Screen (Sigmoidoscopy) (Male) due  08/14/20  Variable frequency           DM - Communication with Managing Provider due  08/14/20  and every 1  year(s)           DM - Eye Exam due  08/14/20  and every 1  year(s)           Hepatitis C Screen 0036-5027 (Male) due  08/14/20  One-time only           Lung Cancer Screen (Male) due  08/14/20  and every 1  year(s)           Zoster/Shingles Vaccine due  08/14/20  One-time only        Near Due            Influenza Vaccine near due  08/31/20  and every 1  year(s)        Due In Future            DM - Foot Exam not due until  02/26/21  and every 1  year(s)           DM - Microalbumin not due until  02/26/21  and every 1  year(s)           Lipid Disorders Screen (Male) not due until  02/26/21  and every 1  year(s)           Type 2 Diabetes Mellitus Screen (Male) not due until  02/26/21  and every 1  year(s)           Alcohol Misuse Screen (Male) not due until  02/26/21  and every 1  year(s)           Depression Screen (Male) not due until  02/26/21  and every 1  year(s)     Satisfied (in the past 1 year)        Satisfied            Alcohol Misuse Screen (Male) on  02/26/20.           Body Mass Index Check (Male) on  08/14/20.           Body Mass Index Check (Male) on  02/26/20.           Body Mass Index Check (Male) on  12/28/19.           DM - Foot Exam on  02/26/20.           DM - HgbA1c on  02/26/20.           DM - Microalbumin on  02/26/20.           DM - Microalbumin on  02/26/20.           Depression Screen (Male) on  02/26/20.           Depression Screen (Male) on  02/26/20.           Depression Screen (Male) on  02/26/20.           High Blood Pressure Screen (Male) on  08/14/20.           High Blood Pressure Screen (Male) on   02/26/20.           High Blood Pressure Screen (Male) on  12/28/19.           Lipid Disorders Screen (Male) on  02/26/20.           Lipid Disorders Screen (Male) on  02/26/20.           Lipid Disorders Screen (Male) on  02/26/20.           Lipid Disorders Screen (Male) on  02/26/20.           Obesity Screen and Counseling (Male) on  08/14/20.           Obesity Screen and Counseling (Male) on  02/26/20.           Obesity Screen and Counseling (Male) on  12/28/19.           Tetanus Vaccine on  02/26/20.           Tobacco Use Screen (Male) on  08/14/20.           Tobacco Use Screen (Male) on  02/26/20.           Tobacco Use Screen (Male) on  12/28/19.           Type 2 Diabetes Mellitus Screen (Male) on  02/26/20.          Review / Management   Results review      Impression and Plan   Diagnosis     Dyslipidemia (YLD00-OX E78.5).     HTN (hypertension) (LIE89-SW I10).     Type 2 diabetes mellitus (SHY34-AX E11.9).     Course:  Improving.    Plan:    fu 6 mo, Overall doing well hypertension under reasonable control diabetes has been excellent we will recheck his A1c today.  Also continues to tolerate atorvastatin well.  We did review the current pandemic and risk with him.  .    Patient Instructions:       Counseled: Patient, Regarding diagnosis, Regarding treatment, Regarding medications, Diet, Activity.

## 2022-02-15 NOTE — NURSING NOTE
Comprehensive Intake Entered On:  8/14/2020 8:17 AM CDT    Performed On:  8/14/2020 8:16 AM CDT by Sandra Nice               Summary   Chief Complaint :   chronic disease visit   Weight Measured :   259.5 lb(Converted to: 259 lb 8 oz, 117.71 kg)    Height Measured :   76 in(Converted to: 6 ft 4 in, 193.04 cm)    Body Mass Index :   31.58 kg/m2 (HI)    Body Surface Area :   2.51 m2   Systolic Blood Pressure :   119 mmHg   Diastolic Blood Pressure :   81 mmHg (HI)    Mean Arterial Pressure :   94 mmHg   Peripheral Pulse Rate :   76 bpm   BP Method :   Electronic   HR Method :   Electronic   Temperature Tympanic :   97.9 DegF(Converted to: 36.6 DegC)    Sandra Nice - 8/14/2020 8:16 AM CDT   Health Status   Allergies Verified? :   Yes   Medication History Verified? :   Yes   Pre-Visit Planning Status :   Completed   Tobacco Use? :   Never smoker   Sandra Nice - 8/14/2020 8:16 AM CDT   ID Risk Screen   Recent Travel History :   No recent travel   Family Member Travel History :   No recent travel   Other Exposure to Infectious Disease :   Unknown   Sandra Nice - 8/14/2020 8:16 AM CDT

## 2022-02-15 NOTE — PROGRESS NOTES
Patient:   ROSA MAY            MRN: [id number]            FIN: [id number]               Age:   58 years     Sex:  Male     :  1960   Associated Diagnoses:   Type 2 diabetes mellitus; Obesity; Dyslipidemia; HTN (hypertension)   Author:   Mira English      Visit Information   Visit type:  Diabetes Self Management Education .    Referral source:  Marcus Wolff MD.       Chief Complaint   DM Type II, Obesity, Hyperlipidemia, HTN      History of Present Illness   Pt here for DM education he has a strong knowledge base with his son having Type I DM.    Discussed nutrition, diabetes, and lifestyle management with pt  Nutrition:  Portion sizes had been large and is now trying to decrease starches, was drinking regular soda and is now choosing diet  Physical Activity:  2mi on the eliptical ~45 min or ~ 30 min on the treadmill, tries to exercise daily   Stress:   high with work   Sleep: wears c-pap  Support: wife  BG Testing: has a meter at home and states avg is ~120's- 150's  tries to test 3x/ ay   DM related medication: metformin 500mg BID - taking as directed   Routine diabetes care:  recent eye exam, dental exam q 6 mo, pneumonia vaccine UTD, influenza vaccine recommended in the fall, skin intact       Health Status   Allergies:    Allergic Reactions (Selected)  Severity Not Documented  Dymatap (Skin ulcer)   Medications:  (Selected)   Prescriptions  Prescribed  CPAP Supplies: CPAP Supplies, See Instructions, Instructions: heated humidifier, heated tubing, filters, nasal or full face mask of choice with headgear.  Replacement cushions and supplies as needed.   Months=99 (lifetime), Supply, # 1 EA, 0 Refill(s), Type: Mainten...  atorvastatin 80 mg oral tablet: 1 tab(s) ( 80 mg ), po, daily, # 90 tab(s), 1 Refill(s), Type: Maintenance, Pharmacy: [facility], 1 tab(s) Oral daily  losartan 25 mg oral tablet: 1 tab(s) ( 25 mg ), PO, Daily, # 90 tab(s), 0 Refill(s), Type: Maintenance,  Pharmacy: [facility], 1 tab(s) Oral daily  metFORMIN 500 mg oral tablet: 1 tab(s) ( 500 mg ), PO, BID, # 180 tab(s), 1 Refill(s), Type: Maintenance, Pharmacy: [facility], 1 tab(s) Oral bid  one touch verio test strips: one touch verio test strips, See Instructions, Instructions: Test 3x/ day, Supply, # 300 EA, 3 Refill(s), Type: Maintenance, Pharmacy: [facility], Test 3x/ day   Problem list:    All Problems (Selected)  Dyslipidemia / SNOMED CT 8311935495 / Confirmed  Gout / SNOMED CT 775131985 / Confirmed  HTN (hypertension) / SNOMED CT 4871264897 / Confirmed  Obesity / SNOMED CT 5038887902 / Probable  Seasonal allergies / SNOMED CT 596109024 / Confirmed  Type 2 diabetes mellitus / SNOMED CT 546067479 / Confirmed      Histories   Past Medical History:    Active  Seasonal allergies (078532986)   Family History:    Diabetes mellitus type I  Son (Daniel)  Stroke  Mother (Mariela)  Autism  Son (Daniel)  Bladder cancer..  Father (Thierno)     Procedure history:    No active procedure history items have been selected or recorded.   Social History:        Alcohol Assessment            Current, 6 beers per year      Tobacco Assessment            Never      Substance Abuse Assessment            Never      Employment and Education Assessment            Employed, Work/School description: Maintance Supervisor.      Home and Environment Assessment            Marital status: .  Spouse/Partner name: Rhoda Kendall.      Nutrition and Health Assessment            Type of diet: Regular.      Sexual Assessment            Sexually active: Yes.  Identifies as male, Sexual orientation: Straight or heterosexual.        Physical Examination   Vital Signs   8/13/2018 4:24 PM CDT Systolic Blood Pressure 120 mmHg    Diastolic Blood Pressure 82 mmHg  HI    Mean Arterial Pressure 95 mmHg    Vital Signs Comments first /82      Measurements from flowsheet : Measurements    8/13/2018 4:24 PM CDT Height Measured - Standard 76 in    Weight Measured - Standard 261 lb    BSA 2.52 m2    Body Mass Index 31.77 kg/m2  HI         Health Maintenance      Recommendations     Pending (in the next year)        OverDue           Alcohol Misuse Screen (Male) due  05/02/15  and every 1  year(s)           Depression Screen (Male) due  05/12/18  and every 1  year(s)        Due            Aspirin Therapy for Prevention of CVD (Male) due  08/13/18  and every 5  year(s)           Colorectal Cancer Screen (Colonoscopy) (Male) due  08/13/18  and every 10  year(s)           Colorectal Cancer Screen (Occult Blood) (Male) due  08/13/18  Variable frequency           Colorectal Cancer Screen (Sigmoidoscopy) (Male) due  08/13/18  Variable frequency           DM - Communication with Managing Provider due  08/13/18  and every 1  year(s)           DM - Eye Exam due  08/13/18  and every 1  year(s)           Hepatitis C Screen 9310-6855 (Male) due  08/13/18  One-time only           Influenza Vaccine due  08/13/18  and every 1  year(s)           Lung Cancer Screen (Male) due  08/13/18  and every 1  year(s)        Near Due            DM - HgbA1c near due  10/11/18  and every 3  month(s)        Due In Future            Tobacco Use Screen (Male) not due until  07/11/19  and every 1  year(s)           DM - Microalbumin not due until  07/11/19  and every 1  year(s)           Lipid Disorders Screen (Male) not due until  07/11/19  and every 1  year(s)           DM - Foot Exam not due until  07/11/19  and every 1  year(s)     Satisfied (in the past 1 year)        Satisfied            Body Mass Index Check (Male) on  08/13/18.           Body Mass Index Check (Male) on  07/11/18.           DM - Foot Exam on  07/11/18.           DM - HgbA1c on  07/11/18.           DM - Microalbumin on  07/11/18.           DM - Microalbumin on  07/11/18.           High Blood Pressure Screen (Male) on  08/13/18.           High Blood Pressure  Screen (Male) on  07/11/18.           Lipid Disorders Screen (Male) on  07/11/18.           Lipid Disorders Screen (Male) on  07/11/18.           Lipid Disorders Screen (Male) on  07/11/18.           Lipid Disorders Screen (Male) on  07/11/18.           Lipid Disorders Screen (Male) on  07/11/18.           Obesity Screen and Counseling (Male) on  08/13/18.           Obesity Screen and Counseling (Male) on  07/11/18.           Tobacco Use Screen (Male) on  07/11/18.        Review / Management   Results review:  Lab results   7/11/2018 8:46 AM CDT Sodium Level 138 mmol/L    Potassium Level 4.4 mmol/L    Chloride Level 101 mmol/L    CO2 Level 28 mmol/L    Glucose Level 252 mg/dL  HI    BUN 13 mg/dL    Creatinine 1.00 mg/dL    BUN/Creat Ratio NOT APPLICABLE    eGFR 83 mL/min/1.73m2    eGFR African American 96 mL/min/1.73m2    Calcium Level 9.3 mg/dL    Hgb A1c 12.1  HI    Cholesterol 229 mg/dL  HI    Non-  HI    HDL 38 mg/dL  LOW    Chol/HDL Ratio 6.0  HI    LDL See comment    LDL Direct 106 mg/dL  HI    Triglyceride 541 mg/dL  HI    U Microalbumin 3.6 mg/dL    Ur Creatinine 264 mg/dL    Ur Microalb/Creat Ratio 14   .       Impression and Plan   Diagnosis     Type 2 diabetes mellitus (YMG69-CR E11.9).     Obesity (BUJ22-NS E66.9).     Dyslipidemia (DEP99-QS E78.5).     HTN (hypertension) (TEK64-YZ I10).       Counseled: Patient, ALBA Self Care Behaviors   Today the patient was instructed on the basic physiology of diabetes mellitus, BG testing, and lifestyle guidelines.  Healthy Eating - Education on what foods are primary sources of carbohydrates and how intake affects BG readings, edu on carbohydrate counting, reading food labels, appropriate portion sizes, well balanced meals, diabetic plate method as a general rule.  Patient is provided with numerous ideas to incorporate dietary recommendations, meal planning and preparation, mindful eating techniques, 7 day menu and weight loss tips.    Being Active -  Education on how exercise helps with :    - lowering BG by increasing the muscles ability to take up and use glucose   - weight loss   - healthier heart (improve lipid profile)   - improve sleep, mood, energy   - decrease stress      Monitoring - Today the patient is instructed on home blood glucose monitoring.  Pt is provided with a meter.  Pt is instructed on the proper use of the meter, recommended testing schedule and blood glucose target levels.    Taking Medication - On Metformin - education on the mechanism of action, discussed potential for increasing medication with the progression of DM or the results of next hgb a1c or avg glucose  Problem Solving - medical support team assistance, resources provided (written and apps, internet); good control of stress  Healthy Coping - support of friends, family, and medical support team   Reducing Risks - Detailed education on potential complications associated with uncontrolled diabetes and prevention recommendations.  Recommendations include: annual eye exam, good oral cares with brushing BID and flossing daily, routine dental apts, good foot care tips and shoewear - discussed monofilament test yearly.  Importance of adequate sleep and good control of BG to prevent developing complications related to uncontrolled DM including nephropathy, neuropathy, retinopathy, and cardiovascular disease.  Weight loss goal of 7 - 10% of current body weight pounds as a reasonable goal to help increase insulin sensitivity      Goals:   1.  Practice healthy stress management and get good quality sleep with the goal of 7-8 hours per night.    2.   Physical activity: Recommend increasing to 2 hrs and 30 min a week (150 minutes) of moderate-intensity, or 1 hr and 15 min (75 minutes) a week of vigorous-intensity aerobic physical activity, or an equivalent combination of moderate- and vigorous-intensity aerobic physical activity.  Aerobic activity should be performed in episodes of at least  10 minutes, preferably spread throughout the week.  Muscle-strengthening activities on 2 or more days per week.    3.  Eat in a healthy way, per diabetic plate method.  Nutrition reference: Eat 3 meals a day; snacks are optional.  A meal is three or more food groups; make it colorful for better nutrition.    4.  Total Carbohydrate intake 30 grams for meals, snacks ~ 15 grams  5.  Pt to monitor BG BID.  BG goals: Fasting and before meals 80 - 120 mg/dL, 2 hrs after the start of a meal 80 - 140 mg/dL.    6.  Goal weight 235 by 2/2019  7.  Read handouts provided.  F/u in 6 mo       Professional Services   Time spent with pt 30 min   cc  Dr. Wolff

## 2022-02-15 NOTE — LETTER
(Inserted Image. Unable to display)   March 03, 2020      ROSA MAY   [address] [place], [place] [id number]        Dear ROSA,      Thank you for selecting [facility] ([facility] for your healthcare needs.     The results of the cologuard stool test was received and your result was negative.      A negative result indicates a lower likelihood that colorectal cancer or advanced adenoma (pre-cancer) is present.  Periodic Colorectal Cancer Screenings is recommended in the future approximately every 3 years.             Please contact me or my assistant at [phone] if you have any questions or concerns.     Sincerely,

## 2022-02-15 NOTE — NURSING NOTE
Comprehensive Intake Entered On:  2021 8:16 AM CST    Performed On:  2021 8:11 AM CST by Christie Vallejo CMA               Summary   Chief Complaint :   DM/HTN/HLD f/u and refills. COVID booster and flu due. Foot exam due. Needs new meter and supplies.    Weight Measured :   273 lb(Converted to: 273 lb 0 oz, 123.831 kg)    Systolic Blood Pressure :   149 mmHg (HI)    Diastolic Blood Pressure :   83 mmHg (HI)    Mean Arterial Pressure :   105 mmHg   Peripheral Pulse Rate :   80 bpm   BP Site :   Right arm   Pulse Site :   Radial artery   BP Method :   Electronic   HR Method :   Electronic   Temperature Tympanic :   97.9 DegF(Converted to: 36.6 DegC)    Christie Vallejo CMA - 2021 8:11 AM CST   Health Status   Allergies Verified? :   Yes   Medication History Verified? :   Yes   Pre-Visit Planning Status :   Completed   Christie Vallejo CMA - 2021 8:11 AM CST   Demographics   Last Name :   LALO   Address :   [address]   First Name :   ROSA Fuchs Initial :   ONESIMO   Responsible Party Date of Birth () :   1960 CDT   City :   [place]   State :   [place]   Zip Code :   [id number]   Contact Relationship to Patient (other) :   Patient   Yoni Christie CASTANEDA - 2021 8:11 AM CST   Consents   Consent for Immunization Exchange :   Consent Granted   Consent for Immunizations to Providers :   Consent Granted   Christie Vallejo CMA - 2021 8:11 AM CST   Meds / Allergies   (As Of: 2021 8:16:58 AM CST)   Allergies (Active)   Dymatap  Estimated Onset Date:   Unspecified ; Reactions:   skin ulcer ; Created By:   Sandra Juan CMA; Reaction Status:   Active ; Category:   Drug ; Substance:   Dymatap ; Type:   Allergy ; Updated By:   Sandra Juan CMA; Reviewed Date:   2021 7:56 AM CST        Medication List   (As Of: 2021 8:16:58 AM CST)   Prescription/Discharge Order    aspirin  :   aspirin ; Status:   Prescribed ; Ordered As Mnemonic:   aspirin 81 mg oral tablet, chewable ;  Simple Display Line:   81 mg, 1 tab(s), Oral, daily, 90 tab(s), 0 Refill(s) ; Ordering Provider:   Marcus Wolff MD; Catalog Code:   aspirin ; Order Dt/Tm:   2/18/2021 7:59:55 AM CST          atorvastatin  :   atorvastatin ; Status:   Prescribed ; Ordered As Mnemonic:   atorvastatin 80 mg oral tablet ; Simple Display Line:   1 tab(s), Oral, daily, 30 tab(s), 0 Refill(s) ; Ordering Provider:   Marcus Wolff MD; Catalog Code:   atorvastatin ; Order Dt/Tm:   10/19/2021 12:39:29 PM CDT          losartan  :   losartan ; Status:   Prescribed ; Ordered As Mnemonic:   losartan 25 mg oral tablet ; Simple Display Line:   1 tab(s), Oral, daily, 30 tab(s), 0 Refill(s) ; Ordering Provider:   Marcus Wolff MD; Catalog Code:   losartan ; Order Dt/Tm:   11/2/2021 4:16:36 PM CDT          metFORMIN  :   metFORMIN ; Status:   Prescribed ; Ordered As Mnemonic:   metFORMIN 500 mg oral tablet ; Simple Display Line:   1 tab(s), Oral, bid, 60 tab(s), 0 Refill(s) ; Ordering Provider:   Marcus Wolff MD; Catalog Code:   metFORMIN ; Order Dt/Tm:   10/19/2021 12:40:19 PM CDT          Miscellaneous Rx Supply  :   Miscellaneous Rx Supply ; Status:   Prescribed ; Ordered As Mnemonic:   one touch verio test strips ; Simple Display Line:   See Instructions, Test 3x/ day, 300 EA, 3 Refill(s) ; Ordering Provider:   Marcus Wolff MD; Catalog Code:   Miscellaneous Rx Supply ; Order Dt/Tm:   12/13/2018 8:33:34 AM CST            Home Meds    cinnamon  :   cinnamon ; Status:   Documented ; Ordered As Mnemonic:   cinnamon 500 mg oral capsule ; Simple Display Line:   500 mg, 1 cap(s), Oral, daily, 0 Refill(s) ; Catalog Code:   cinnamon ; Order Dt/Tm:   12/21/2021 8:14:35 AM CST

## 2022-02-15 NOTE — NURSING NOTE
CAGE Assessment Entered On:  2/22/2021 9:40 AM CST    Performed On:  2/18/2021 9:40 AM CST by Dariana Mckeon CMA               Assessment   Have you ever felt you should cut down on your drinking :   No   Have people annoyed you by criticizing your drinking :   No   Have you ever felt bad or guilty about your drinking :   No   Have you ever taken a drink first thing in the morning to steady your nerves or get rid of a hangover (Eye-opener) :   No   CAGE Score :   0    Dariana Mckeon CMA - 2/22/2021 9:40 AM CST

## 2022-02-15 NOTE — NURSING NOTE
Quick Intake Entered On:  12/21/2021 8:37 AM CST    Performed On:  12/21/2021 8:37 AM CST by Marcus Wolff MD               Summary   Systolic Blood Pressure :   120 mmHg   Diastolic Blood Pressure :   70 mmHg   Mean Arterial Pressure :   87 mmHg   Vital Signs Comments :   home   Marcus Wolff MD - 12/21/2021 8:37 AM CST

## 2022-02-15 NOTE — NURSING NOTE
Hearing and Vision Screening Entered On:  6/4/2019 4:49 PM CDT    Performed On:  6/4/2019 4:47 PM CDT by Sandra Nice               Hearing and Vision Screening   Audiogram Result Right Ear :   Fail   Audiogram Result Left Ear :   Fail   Hearing Screen Comments :   Does hearing screenings through work.    Sandra Nice - 6/4/2019 4:47 PM CDT

## 2022-02-15 NOTE — TELEPHONE ENCOUNTER
---------------------  From: Ashley Rush CMA   Sent: 5/31/2019 11:46:21 AM CDT  Subject: General Message-med mgmt     request from [facility] for refill on meds.  pt has appt 6/4 - refilled x 3mos to mailorder

## 2022-02-15 NOTE — LETTER
(Inserted Image. Unable to display)   June 05, 2019      ROSA MAY       [address] [place], [place] [id number]        Dear ROSA,    Thank you for selecting [facility] for your healthcare needs.  Below you will find the results of the recent tests done at our clinic.     All labs acceptable.      Result Name Current Result Previous Result Reference Range   Hgb A1c ((H)) 5.8 6/4/2019 ((H)) 6.0 12/13/2018  - <5.7       Please contact me or my assistant at [phone] if you have any questions.     Sincerely,        Marcus Wolff MD        What do your labs mean?  Below is a glossary of commonly ordered labs:  LDL   Bad Cholesterol   HDL   Good Cholesterol  AST/ALT   Liver Function   Cr/Creatinine   Kidney Function  Microalbumin   Kidney Function  BUN   Kidney Function  PSA   Prostate    TSH   Thyroid Hormone  HgbA1c   Diabetes Test   Hgb (Hemoglobin)   Red Blood Cells

## 2022-02-15 NOTE — PROGRESS NOTES
Patient:   ROSA MAY            MRN: [id number]            FIN: [id number]               Age:   58 years     Sex:  Male     :  1960   Associated Diagnoses:   Dyslipidemia; HTN (hypertension); Type 2 diabetes mellitus   Author:   Marcus Wolff MD      Visit Information      Date of Service: 2018 08:00 am  Performing Location: [facility]  Encounter#: [id number]      Primary Care Provider (PCP):  Marcus Wolff MD    NPI# [id number]      Referring Provider:  Marcus Wolff MD# [id number]      Chief Complaint   2018 8:03 AM CST   HTN/DM check up.        History of Present Illness   Doing well  Tolerating meds well  Ave glucose 120  40lb weight loss      Review of Systems   Constitutional:  Negative.    Eye:  Negative.    Ear/Nose/Mouth/Throat:  Negative.    Respiratory:  Negative.    Cardiovascular:  Negative.    Gastrointestinal:  Negative.    Genitourinary:  Negative.    Hematology/Lymphatics:  Negative.    Endocrine:  Negative.    Immunologic:  Negative.    Musculoskeletal:  Negative.    Integumentary:  Negative.    Neurologic:  Negative.       Health Status   Allergies:    Allergic Reactions (Selected)  Severity Not Documented  Dymatap (Skin ulcer)   Medications:  (Selected)   Prescriptions  Prescribed  CPAP Supplies: CPAP Supplies, See Instructions, Instructions: heated humidifier, heated tubing, filters, nasal or full face mask of choice with headgear.  Replacement cushions and supplies as needed.   Months=99 (lifetime), Supply, # 1 EA, 0 Refill(s), Type: Mainten...  atorvastatin 80 mg oral tablet: = 1 tab(s) ( 80 mg ), po, daily, # 90 tab(s), 1 Refill(s), Type: Maintenance, Pharmacy: [facility], 1 tab(s) Oral daily  losartan 25 mg oral tablet: = 1 tab(s) ( 25 mg ), PO, Daily, # 90 tab(s), 1 Refill(s), Type: Maintenance, Pharmacy: [facility], 1 tab(s) Oral daily  metFORMIN 500 mg oral tablet: = 1 tab(s) ( 500 mg ),  PO, BID, # 180 tab(s), 1 Refill(s), Type: Maintenance, Pharmacy: [facility], 1 tab(s) Oral bid  one touch verio test strips: one touch verio test strips, See Instructions, Instructions: Test 3x/ day, Supply, # 300 EA, 3 Refill(s), Type: Maintenance, Pharmacy: [facility], Test 3x/ day,    Medications          *denotes recorded medication          CPAP Supplies: See Instructions, heated humidifier, heated tubing, filters, nasal or full face mask of choice with headgear.  Replacement cushions and supplies as needed.   Months=99 (lifetime), 1 EA, 0 Refill(s).          one touch verio test strips: See Instructions, Test 3x/ day, 300 EA, 3 Refill(s).          atorvastatin 80 mg oral tablet: 80 mg, 1 tab(s), po, daily, 90 tab(s), 1 Refill(s).          losartan 25 mg oral tablet: 25 mg, 1 tab(s), PO, Daily, 90 tab(s), 1 Refill(s).          metFORMIN 500 mg oral tablet: 500 mg, 1 tab(s), PO, BID, 180 tab(s), 1 Refill(s).   Problem list:    All Problems  Dyslipidemia / SNOMED CT 0275965221 / Confirmed  Gout / SNOMED CT 202663478 / Confirmed  HTN (hypertension) / SNOMED CT 5306556887 / Confirmed  Obesity / SNOMED CT 9351018912 / Probable  Seasonal allergies / SNOMED CT 978917063 / Confirmed  Type 2 diabetes mellitus / SNOMED CT 404167994 / Confirmed  Canceled: Diabetes / SNOMED CT 920557577  Canceled: Prediabetes / SNOMED CT 14877081      Histories   Past Medical History:    Active  Seasonal allergies (374006930)   Family History:    Diabetes mellitus type I  Son (Daniel)  Stroke  Mother (Mariela)  Autism  Son (Daniel)  Bladder cancer..  Father (Thierno)     Procedure history:    No active procedure history items have been selected or recorded.   Social History:        Alcohol Assessment            Current, 6 beers per year      Tobacco Assessment            Never      Substance Abuse Assessment            Never      Employment and Education Assessment            Employed, Work/School  description: Maintance Supervisor.      Home and Environment Assessment            Marital status: .  Spouse/Partner name: Rhoda Kendall.      Nutrition and Health Assessment            Type of diet: Regular.      Sexual Assessment            Sexually active: Yes.  Identifies as male, Sexual orientation: Straight or heterosexual.      Physical Examination   Vital Signs   12/13/2018 8:03 AM CST Temperature Tympanic 97.6 DegF  LOW    Peripheral Pulse Rate 76 bpm    HR Method Electronic    Systolic Blood Pressure 124 mmHg    Diastolic Blood Pressure 77 mmHg    Mean Arterial Pressure 93 mmHg    BP Method Electronic      Measurements from flowsheet : Measurements   12/13/2018 8:03 AM CST Height Measured - Standard 76 in    Weight Measured - Standard 238.4 lb    BSA 2.41 m2    Body Mass Index 29.02 kg/m2  HI      General:  Alert and oriented, No acute distress.    Neck:  Supple.    Respiratory:  Respirations are non-labored.    Cardiovascular:  Normal rate, Regular rhythm.    Neurologic:  Alert, Oriented.       Health Maintenance      Recommendations     Pending (in the next year)        OverDue           Alcohol Misuse Screen (Male) due  05/02/15  and every 1  year(s)           Depression Screen (Male) due  05/12/18  and every 1  year(s)        Due            DM - HgbA1c due  12/12/18  and every 3  month(s)           Aspirin Therapy for Prevention of CVD (Male) due  12/13/18  and every 5  year(s)           Colorectal Cancer Screen (Occult Blood) (Male) due  12/13/18  Variable frequency           Colorectal Cancer Screen (Colonoscopy) (Male) due  12/13/18  Variable frequency           Colorectal Cancer Screen (Sigmoidoscopy) (Male) due  12/13/18  Variable frequency           DM - Communication with Managing Provider due  12/13/18  and every 1  year(s)           DM - Eye Exam due  12/13/18  and every 1  year(s)           Hepatitis C Screen 5112-5256 (Male) due  12/13/18  One-time only           Lung Cancer Screen  (Male) due  12/13/18  and every 1  year(s)        Due In Future            DM - Microalbumin not due until  07/11/19  and every 1  year(s)           Lipid Disorders Screen (Male) not due until  07/11/19  and every 1  year(s)           DM - Foot Exam not due until  07/11/19  and every 1  year(s)           Type 2 Diabetes Mellitus Screen (Male) not due until  09/12/19  and every 1  year(s)     Satisfied (in the past 1 year)        Satisfied            Body Mass Index Check (Male) on  12/13/18.           Body Mass Index Check (Male) on  08/13/18.           Body Mass Index Check (Male) on  07/11/18.           DM - Foot Exam on  07/11/18.           DM - HgbA1c on  09/12/18.           DM - HgbA1c on  07/11/18.           DM - Microalbumin on  07/11/18.           DM - Microalbumin on  07/11/18.           High Blood Pressure Screen (Male) on  12/13/18.           High Blood Pressure Screen (Male) on  09/12/18.           High Blood Pressure Screen (Male) on  08/13/18.           High Blood Pressure Screen (Male) on  07/11/18.           Influenza Vaccine on  12/13/18.           Lipid Disorders Screen (Male) on  07/11/18.           Lipid Disorders Screen (Male) on  07/11/18.           Lipid Disorders Screen (Male) on  07/11/18.           Lipid Disorders Screen (Male) on  07/11/18.           Lipid Disorders Screen (Male) on  07/11/18.           Obesity Screen and Counseling (Male) on  12/13/18.           Obesity Screen and Counseling (Male) on  09/12/18.           Obesity Screen and Counseling (Male) on  08/13/18.           Obesity Screen and Counseling (Male) on  07/11/18.           Tobacco Use Screen (Male) on  12/13/18.           Tobacco Use Screen (Male) on  09/12/18.           Tobacco Use Screen (Male) on  07/11/18.           Type 2 Diabetes Mellitus Screen (Male) on  09/12/18.           Type 2 Diabetes Mellitus Screen (Male) on  07/11/18.      Review / Management   Results review      Impression and Plan   Diagnosis      Dyslipidemia (ZUE74-DX E78.5).     HTN (hypertension) (XMG91-QW I10).     Type 2 diabetes mellitus (QNR46-JU E11.9).     Course:  Improving.    Plan:    fu 6 mo.    Patient Instructions:       Counseled: Patient, Regarding diagnosis, Regarding treatment, Regarding medications, Diet, Activity.

## 2022-02-15 NOTE — PROGRESS NOTES
Patient:   ROSA MAY            MRN: [id number]            FIN: [id number]               Age:   56 years     Sex:  Male     :  1960   Associated Diagnoses:   Annual exam; Blood Glucose Elevated; Central Sleep Apnea; Lipids abnormal   Author:   Marcus Wolff MD      Visit Information      Date of Service: 2017 07:42 am  Performing Location: [facility]  Encounter#: [id number]      Primary Care Provider (PCP):  Marcus Wolff MD    NPI# [id number]      Referring Provider:  No referring provider recorded for selected visit.      Chief Complaint   2017 7:44 AM CDT    Px     Routine Health Care Visit      History of Present Illness   Doing well no concerns             The patient presents for a general exam.  The patient's general health status is described as good.  The patient's diet is described as balanced.  Exercise: occasional.  Complaint:  uses CPAP machine   Doing well with his Lipitor.  Additional pertinent history: occasional caffeine use, tobacco use none and no alcohol use.     [employer]   22-year-old son is autistic and has type I diabetes.        Review of Systems   Constitutional:  Negative.    Eye:  Negative.    Ear/Nose/Mouth/Throat:  Negative.    Respiratory:  Negative.    Cardiovascular:  Negative.    Gastrointestinal:  Negative.    Genitourinary:  Negative.    Hematology/Lymphatics:  Negative.    Endocrine:  Negative.    Immunologic:  Negative.    Musculoskeletal:  Negative.    Integumentary:  Negative.    Neurologic:  Negative except as documented in history of present illness.    Psychiatric:  Negative.    All other systems reviewed and negative      Health Status   Allergies:    Allergic Reactions (Selected)  Severity Not Documented  Dymatap (Skin ulcer)   Medications:  (Selected)   Prescriptions  Prescribed  Advil 200 mg oral tablet: See Instructions, Instructions: 4 Tabs  three times daily for 5-7 days prn  gout., # 100 EA, 0 Refill(s), Type: Maintenance, OTC (Rx)  atorvastatin 80 mg oral tablet: 1 tab(s) ( 80 mg ), po, daily, # 90 tab(s), 3 Refill(s), Type: Soft Stop, Pharmacy: [facility], 1 tab(s) po daily   Problem list:    All Problems  Obesity / SNOMED CT 0290138612 / Probable  Gout / SNOMED CT 572928006 / Confirmed  Dyslipidemia / SNOMED CT 3465340118 / Confirmed  Inactive: Prediabetes / SNOMED CT 44139019      Histories   Past Medical History:    No active or resolved past medical history items have been selected or recorded.   Family History:    Mother: Mariela    Stroke  Father: Thierno    Bladder cancer..  Sister    History is negative.  Brother    History is negative.  Son: Daniel    Autism  Diabetes mellitus type I     Procedure history:    No active procedure history items have been selected or recorded.   Social History:        Alcohol Assessment            Current, 6 beers per year      Tobacco Assessment            Never      Substance Abuse Assessment            Never      Employment and Education Assessment            Employed, Work/School description: Maintance Supervisor.      Home and Environment Assessment            Marital status: .  Spouse/Partner name: Rhoda Kendall.      Nutrition and Health Assessment            Type of diet: Regular.      Exercise and Physical Activity Assessment: Does not exercise        Physical Examination   Vital Signs   5/12/2017 7:44 AM CDT Temperature Tympanic 98.2 DegF    Peripheral Pulse Rate 76 bpm    Systolic Blood Pressure 133 mmHg    Diastolic Blood Pressure 84 mmHg    Mean Arterial Pressure 100 mmHg      Measurements from flowsheet : Measurements   5/12/2017 7:44 AM CDT Height Measured - Standard 76 in    Weight Measured - Standard 282.8 lb    BSA 2.62 m2    Body Mass Index 34.42 kg/m2      General:  Alert and oriented.    Eye:  Pupils are equal, round and reactive to light, Normal conjunctiva.    HENT:  Normocephalic, Tympanic  membranes are clear, Oral mucosa is moist.    Neck:  Supple, Non-tender, No lymphadenopathy, No thyromegaly.    Respiratory:  Breath sounds are equal, Symmetrical chest wall expansion.         Respirations: Are within normal limits.         Pattern: Regular.         Breath sounds: Bilateral, Within normal limits.    Cardiovascular:  Normal rate, Regular rhythm, No murmur, Good pulses equal in all extremities, Normal peripheral perfusion, No edema.    Breast:  No mass.         Lymph nodes: Within normal limits.    Gastrointestinal:  Soft, Non-tender, Non-distended, Normal bowel sounds.    Musculoskeletal:  No tenderness, No swelling.    Integumentary:  Warm, Dry.    Neurologic:  No focal deficits.    Cognition and Speech:  Oriented, Speech clear and coherent.    Psychiatric:  Appropriate mood & affect, Normal judgment.       Health Maintenance      Recommendations     Pending (in the next year)        OverDue           Alcohol Misuse Screen (Male) due  05/02/15  and every 1  year(s)           Lipid Disorders Screen (Male) due  12/03/16  and every 1  year(s)           Depression Screen (Male) due  12/03/16  and every 1  year(s)        Due            Aspirin Therapy for Prevention of CVD (Male) due  05/12/17  and every 5  year(s)           Colorectal Cancer Screen (Colonoscopy) (Male) due  05/12/17  and every 10  year(s)           Colorectal Cancer Screen (Occult Blood) (Male) due  05/12/17  Variable frequency           Colorectal Cancer Screen (Sigmoidoscopy) (Male) due  05/12/17  Variable frequency           Influenza Vaccine due  05/12/17  and every 1  year(s)           Lung Cancer Screen (Male) due  05/12/17  and every 1  year(s)     Satisfied (in the past 1 year)        Satisfied            Body Mass Index Check (Male) on  05/12/17.           Body Mass Index Check (Male) on  10/10/16.           High Blood Pressure Screen (Male) on  05/12/17.           High Blood Pressure Screen (Male) on  10/10/16.            Obesity Screen and Counseling (Male) on  05/12/17.           Obesity Screen and Counseling (Male) on  10/10/16.           Tobacco Use Screen (Male) on  05/12/17.           Tobacco Use Screen (Male) on  10/10/16.          Impression and Plan   Diagnosis     Annual exam (NXM34-CX Z00.00).     Blood Glucose Elevated (LCY76-SH R73.09).     Central Sleep Apnea (FYS72-MR G47.30).     Lipids abnormal (LEP79-PI E78.9).     Plan:  BMI,Weight loss,exercise,diet discussed  Cardiac calcium score , cologuard.    Patient Instructions:       Counseled: Patient, Diet, Activity, Verbalized understanding.    Counseled:  Patient, Routine exercise and healthy weight maintenance discussed.    Patient Instructions:  Return to clinic in one year for next routine health visit, or sooner if problems or concerns.

## 2022-02-15 NOTE — NURSING NOTE
Comprehensive Intake Entered On:  2/26/2020 7:56 AM CST    Performed On:  2/26/2020 7:55 AM CST by Sandra Nice               Summary   Chief Complaint :   Px   Weight Measured :   253.2 lb(Converted to: 253 lb 3 oz, 114.85 kg)    Height Measured :   76 in(Converted to: 6 ft 4 in, 193.04 cm)    Body Mass Index :   30.82 kg/m2 (HI)    Body Surface Area :   2.48 m2   Systolic Blood Pressure :   135 mmHg (HI)    Diastolic Blood Pressure :   82 mmHg (HI)    Mean Arterial Pressure :   100 mmHg   Peripheral Pulse Rate :   80 bpm   BP Method :   Electronic   HR Method :   Electronic   Temperature Tympanic :   97.2 DegF(Converted to: 36.2 DegC)  (LOW)    Sandra Nice - 2/26/2020 7:55 AM CST   Health Status   Allergies Verified? :   Yes   Medication History Verified? :   Yes   Pre-Visit Planning Status :   Completed   Tobacco Use? :   Never smoker   Sandra Nice - 2/26/2020 7:55 AM CST

## 2022-02-15 NOTE — TELEPHONE ENCOUNTER
Entered by Saranya Nam RN on October 19, 2021 12:40:43 PM CDT  ---------------------  From: Saranya Nam RN   To: [facility]    Sent: 10/19/2021 12:40:42 PM CDT  Subject: Medication Management     ** Submitted: **  Order:metFORMIN (metFORMIN 500 mg oral tablet)  1 tab(s)  Oral  bid  Qty:  60 tab(s)        Refills:  0          Substitutions Allowed     Route To Pharmacy - [facility]    Signed by Saranya Nam RN  10/19/2021 5:40:00 PM UT    ** Submitted: **  Complete:metFORMIN (metFORMIN 500 mg oral tablet)   Signed by Saranya Nam RN  10/19/2021 5:40:00 PM UT    ** Not Approved:  **  metFORMIN (METFORMIN HCL TABS 500MG)  TAKE 1 TABLET TWICE A DAY  Qty:  180 tab(s)        Days Supply:  0        Refills:  3          Substitutions Allowed     Route To Pharmacy - [facility]   Signed by Saranya Nam RN            ** Submitted: **  Order:atorvastatin (atorvastatin 80 mg oral tablet)  1 tab(s)  Oral  daily  Qty:  30 tab(s)        Refills:  0          Substitutions Allowed     Route To Pharmacy - [facility]    Signed by Saranya Nam RN  10/19/2021 5:39:00 PM UTC    ** Submitted: **  Complete:atorvastatin (atorvastatin 80 mg oral tablet)   Signed by Saranya Nam RN  10/19/2021 5:40:00 PM UTC    ** Not Approved:  **  atorvastatin (ATORVASTATIN TABS 80MG)  TAKE 1 TABLET DAILY  Qty:  90 tab(s)        Days Supply:  0        Refills:  3          Substitutions Allowed     Route To Pharmacy - [facility]   Signed by Saranya Nam RN            ------------------------------------------  From: [facility]  To: Marcus Wolff MD  Sent: October 14, 2021 11:40:20 PM CDT  Subject: Medication Management  Due: October 14, 2021 5:09:25 PM CDT     ** On Hold Pending Signature **     Drug: metFORMIN (metFORMIN 500 mg oral tablet), TAKE 1 TABLET TWICE A DAY  Quantity: 180 tab(s)  Days Supply: 0  Refills:  3  Substitutions Allowed  Notes from Pharmacy:     Dispensed Drug: metFORMIN (metFORMIN 500 mg oral tablet), TAKE 1 TABLET TWICE A DAY  Quantity: 180 tab(s)  Days Supply: 0  Refills: 3  Substitutions Allowed  Notes from Pharmacy:     ** On Hold Pending Signature **     Drug: atorvastatin (atorvastatin 80 mg oral tablet), TAKE 1 TABLET DAILY  Quantity: 90 tab(s)  Days Supply: 0  Refills: 3  Substitutions Allowed  Notes from Pharmacy:     Dispensed Drug: atorvastatin (atorvastatin 80 mg oral tablet), TAKE 1 TABLET DAILY  Quantity: 90 tab(s)  Days Supply: 0  Refills: 3  Substitutions Allowed  Notes from Pharmacy:  ------------------------------------------Reminder letter sent  Pt due for visit and labs  Note sent to pharmacy  30 day protocol refills sent in

## 2022-02-15 NOTE — TELEPHONE ENCOUNTER
Entered by Shawn Choi CMA on August 30, 2019 11:30:16 AM CDT  ---------------------  From: Shawn Choi CMA   To: [facility]    Sent: 8/30/2019 11:30:16 AM CDT  Subject: Medication Management     ** Not Approved: Patient has requested refill too soon, Pt given #90 and 1 refill 6-4-19 **  atorvastatin (ATORVASTATIN TABS 80MG)  TAKE 1 TABLET DAILY  Qty:  90 tab(s)        Days Supply:  0        Refills:  4          Substitutions Allowed     Route To Pharmacy - [facility]   Signed by Shawn Choi CMA            ------------------------------------------  From: [facility]  To: Marcus Wolff MD  Sent: August 28, 2019 11:53:11 PM CDT  Subject: Medication Management  Due: August 29, 2019 11:53:11 PM CDT    ** On Hold Pending Signature **  Drug: atorvastatin (atorvastatin 80 mg oral tablet)  1 TAB(S) ORAL DAILY  Quantity: 90 tab(s)     Days Supply: 0         Refills: 0  Substitutions Allowed  Notes from Pharmacy:     Dispensed Drug: atorvastatin (atorvastatin 80 mg oral tablet)  TAKE 1 TABLET DAILY  Quantity: 90 tab(s)     Days Supply: 0         Refills: 4  Substitutions Allowed  Notes from Pharmacy:   ------------------------------------------

## 2022-02-15 NOTE — TELEPHONE ENCOUNTER
---------------------  From: Missy Driscoll LPN (Phone Messages Pool ([id number]_[facility]))   To: [facility] Message Pool ([id number]_[place] [facility] [place]);     Sent: 3/3/2020 12:20:15 PM CST  Subject: Cologuard Results       Phone Message    PCP: [facility]    Time of call: 12:00 message left    Person calling: [employer]  Contact # : [phone]    MESSAGE: Calling with results of a Cologuard. Case# [id number] - no patient information could be left on VM.    Last visit/reason: 2/26/20 - px    12:09pm Called [employer]. Pt contacted them stating the [facility] never received his Cologuard results from last year. I am told that the Cologuard was negative. Results will be faxed to [facility]. If not received by tomorrow, someone is to call back and follow up.---------------------  From: Fadumo James CMA ([facility] Message Pool ([id number]_[facility]))   To: Marcus Wolff MD;     Sent: 3/3/2020 1:07:38 PM CST  Subject: FW: Cologuard Results     OLAYINKA

## 2022-02-15 NOTE — LETTER
(Inserted Image. Unable to display)   August 16, 2020      ROSA MAY       [address] [place], [place] [id number]        Dear ROSA,    Thank you for selecting [facility] for your healthcare needs.  Below you will find the results of the recent tests done at our clinic.     All labs acceptable.      Result Name Current Result Previous Result Reference Range   Hgb A1c ((H)) 6.1 8/14/2020 ((H)) 6.1 2/26/2020  - <5.7       Please contact me or my assistant at [phone] if you have any questions.     Sincerely,        Marcus Wolff MD        What do your labs mean?  Below is a glossary of commonly ordered labs:  LDL   Bad Cholesterol   HDL   Good Cholesterol  AST/ALT   Liver Function   Cr/Creatinine   Kidney Function  Microalbumin   Kidney Function  BUN   Kidney Function  PSA   Prostate    TSH   Thyroid Hormone  HgbA1c   Diabetes Test   Hgb (Hemoglobin)   Red Blood Cells

## 2022-02-15 NOTE — NURSING NOTE
Comprehensive Intake Entered On:  2/18/2021 7:29 AM CST    Performed On:  2/18/2021 7:27 AM CST by Sandra Nice               Summary   Chief Complaint :   Px   Weight Measured :   273.0 lb(Converted to: 273 lb 0 oz, 123.831 kg)    Height Measured :   76 in(Converted to: 6 ft 4 in, 193.04 cm)    Body Mass Index :   33.23 kg/m2 (HI)    Body Surface Area :   2.57 m2   Systolic Blood Pressure :   122 mmHg   Diastolic Blood Pressure :   77 mmHg   Mean Arterial Pressure :   92 mmHg   Peripheral Pulse Rate :   75 bpm   Vital Signs Comments :   thigh cuff used   BP Method :   Electronic   HR Method :   Electronic   Temperature Tympanic :   97.6 DegF(Converted to: 36.4 DegC)  (LOW)    Sandra Nice - 2/18/2021 7:27 AM CST   Health Status   Allergies Verified? :   Yes   Medication History Verified? :   Yes   Pre-Visit Planning Status :   Completed   Tobacco Use? :   Never smoker   Sandra Nice - 2/18/2021 7:27 AM CST   ID Risk Screen   Recent Travel History :   No recent travel   Family Member Travel History :   No recent travel   Other Exposure to Infectious Disease :   Unknown   COVID-19 Testing Status :   No positive COVID-19 test   Sandra Nice - 2/18/2021 7:27 AM CST   Social History   Social History   (As Of: 2/18/2021 7:29:54 AM CST)   Alcohol:        Current, Beer (12 oz), 1-2 times per year, 1 drinks/episode average.  Ready to change: No.   (Last Updated: 2/26/2020 5:52:17 PM CST by Mariel Chin)          Tobacco:        Never (less than 100 in lifetime)   (Last Updated: 2/18/2021 7:27:22 AM CST by Sandra Nice)          Electronic Cigarette/Vaping:        Electronic Cigarette Use: Never.   (Last Updated: 2/18/2021 7:27:26 AM CST by Sandra Nice)          Substance Abuse:        Never   (Last Updated: 5/2/2014 3:16:03 PM CDT by Amberly Layton MA)          Employment/School:        Employed   (Last Updated: 2/26/2020 5:52:28 PM CST by Mariel Chin)          Home/Environment:         Marital status: .  Spouse/Partner name: Rhoda Kendall.  2 children.  Living situation: Home/Independent.  Injuries/Abuse/Neglect in household: No.  Feels unsafe at home: No.  Family/Friends available for support: Yes.   (Last Updated: 2/26/2020 5:52:47 PM CST by Mariel Chin)          Nutrition/Health:        Type of diet: Low carbohydrate.  Wants to lose weight: Yes.  Sleeping concerns: No.  Feels highly stressed: Yes.   (Last Updated: 2/26/2020 5:53:02 PM CST by Mariel Chin)          Exercise:  Occasional exercise      (Last Updated: 2/26/2020 5:53:05 PM CST by Mariel Chin )         Sexual:        Sexually active: Yes.  Identifies as male, Sexual orientation: Straight or heterosexual.  History of STD: No.  History of sexual abuse: No.   (Last Updated: 2/26/2020 5:53:18 PM CST by Mariel Chin)

## 2022-02-15 NOTE — NURSING NOTE
Depression Screening Entered On:  2/22/2021 9:41 AM CST    Performed On:  2/18/2021 9:40 AM CST by Dariana Mckeon CMA               Depression Screening   Little Interest - Pleasure in Activities :   Not at all   Feeling Down, Depressed, Hopeless :   Not at all   Initial Depression Screen Score :   0 Score   Poor Appetite or Overeating :   Not at all   Trouble Falling or Staying Asleep :   Not at all   Feeling Tired or Little Energy :   Not at all   Feeling Bad About Yourself :   Not at all   Trouble Concentrating :   Not at all   Moving or Speaking Slowly :   Not at all   Thoughts Better Off Dead or Hurting Self :   Not at all   Detailed Depression Screen Score :   0    Total Depression Screen Score :   0    Dariana Mckeon CMA - 2/22/2021 9:40 AM CST

## 2022-02-15 NOTE — TELEPHONE ENCOUNTER
---------------------  From: Lina Elias CMA (eRx Pool ([id number]))   To: Phone [place] [place] Pool ([id number]_[place]);     Sent: 2/20/2020 7:47:59 AM CST  Subject: FW: Medication Management   Due Date/Time: 2/20/2020 11:55:00 PM CST     Patient has an appt scheduled with TFS on 2/26/20. Please check if he needs refills prior to appt. If so, would he like them to a local pharmacy rather than [facility]?        ** Patient matched by Lina Elias CMA on 2/20/2020 7:44:53 AM CST **      ------------------------------------------  From: [facility]  To: Marcus Wolff MD  Sent: February 19, 2020 11:55:20 PM CST  Subject: Medication Management  Due: February 20, 2020 11:55:20 PM CST    ** On Hold Pending Signature **  Drug: metFORMIN (metFORMIN 500 mg oral tablet)  TAKE 1 TABLET TWICE A DAY  Quantity: 180 tab(s)  Days Supply: 0  Refills: 4  Substitutions Allowed  Notes from Pharmacy:     Dispensed Drug: metFORMIN (metFORMIN 500 mg oral tablet)  TAKE 1 TABLET TWICE A DAY  Quantity: 180 tab(s)  Days Supply: 0  Refills: 4  Substitutions Allowed  Notes from Pharmacy:   ------------------------------------------Call to pt at [phone]  LVM to call back.[facility] re: status of med refill, if he has enough to last until his appt.---------------------  From: Perlita Benavidez   To: [facility]    Sent: 2/24/2020 7:17:48 PM CST  Subject: FW: Medication Management     ** Not Approved: Patient needs appointment, pt has an appt 02/26. Will fill then **  metFORMIN (METFORMIN HCL TABS 500MG)  TAKE 1 TABLET TWICE A DAY  Qty:  180 tab(s)        Days Supply:  0        Refills:  4          Substitutions Allowed     Route To Pharmacy - [facility]   Signed by Perlita Benavidez

## 2022-02-15 NOTE — TELEPHONE ENCOUNTER
---------------------  From: Lina Elias CMA   To: Appointment Pool ([id number]_[place] - [place]);     Sent: 2/6/2020 8:20:23 AM CST  Subject: Overdue appt     Please try contacting patient to get fasting labs & med check with TFS scheduled.      From 6/5/19 reminder letter note:    Addendum by Raina Summers on December 05, 2019 10:23:39 AM CST (Verified)  Sent letter 1 for fasting and urine labs as well as DM/HTN check per TFS.   DX:  DM, HTN, HLD  No further contact will be made by the recall coordinators regarding this appt. If nothing has been done in THREE weeks, please forward reminder to the Appointment Pool ([place]).      Addendum by Nancy Olson on December 26, 2019 8:18:11 AM CST (Verified)  Provider requesting patient complete services:  Due for:                 (X) fasting labs (nothing to eat or drink for 12 hrs prior to labs, may sip clear water and take medications morning of labs with water only)   (_) non-fasting labs  (X) urine labs (be prepared to leave a urine specimen)  (_) BP check   (_)  Follow-up appointment with Jihan English RD, CDE, CD  (X) follow-up appointment with your health care provider  Condition following up on: Dx: DM, HTN        Provider: TFS        ---------------------  From: Nancy Olson (Recall  [place] ([id number]_[facility]))   To: Appointment Pool ([address]_[place]);     Sent: 12/26/2019 8:18:19 AM CST ! Show up: 12/26/2019 8:18:00 AM CST      Addendum by Rand Rodriguez on December 26, 2019 2:19:01 PM CST (Verified)  Contacted patient, he will call back in January after he checks his schedule.LVM TO SCHEDULE

## 2022-02-15 NOTE — LETTER
(Inserted Image. Unable to display)            December 22, 2021        ROSA MAY       [address] [place] [address]        Dear ROSA,    Thank you for selecting [facility]  for your healthcare needs.  Below you will find the results of the recent tests done at our clinic.     All labs acceptable.      Result Name Current Result Reference Range   Vitamin B12 Level (pg/mL)  317 12/21/2021 200 - 1,100       Please contact me or my assistant at [phone] if you have any questions.     Sincerely,        Marcus Wolff MD        What do your labs mean?  Below is a glossary of commonly ordered labs:  LDL   Bad Cholesterol   HDL   Good Cholesterol  AST/ALT   Liver Function   Cr/Creatinine   Kidney Function  Microalbumin   Kidney Function  BUN   Kidney Function  PSA   Prostate    TSH   Thyroid Hormone  HgbA1c   Diabetes Test   Hgb (Hemoglobin)   Red Blood Cells

## 2022-02-15 NOTE — TELEPHONE ENCOUNTER
Entered by Lauren Sorensen RN on November 02, 2021 4:17:04 PM CDT  ---------------------  From: Lauren Sorensen RN   To: [facility]    Sent: 11/2/2021 4:17:04 PM CDT  Subject: Medication Management     ** Submitted: **  Order:losartan (losartan 25 mg oral tablet)  1 tab(s)  Oral  daily  Qty:  30 tab(s)        Refills:  0          Substitutions Allowed     Route To Pharmacy - [facility]    Signed by Lauren Sorensen RN  11/2/2021 9:16:00 PM [facility]    ** Submitted: **  Complete:losartan (losartan 25 mg oral tablet)   Signed by Lauren Sorensen RN  11/2/2021 9:17:00 PM [facility]    ** Not Approved:  **  losartan (LOSARTAN TABS 25MG)  TAKE 1 TABLET DAILY  Qty:  90 tab(s)        Days Supply:  0        Refills:  3          Substitutions Allowed     [facility]   Signed by Lauren Sorensen RN            ------------------------------------------  From: [facility]  To: Marcus Wolff MD  Sent: November 1, 2021 1:09:01 AM CDT  Subject: Medication Management  Due: October 21, 2021 8:18:04 PM CDT     ** On Hold Pending Signature **     Drug: losartan (losartan 25 mg oral tablet), TAKE 1 TABLET DAILY  Quantity: 90 tab(s)  Days Supply: 0  Refills: 3  Substitutions Allowed  Notes from Pharmacy:     Dispensed Drug: losartan (losartan 25 mg oral tablet), TAKE 1 TABLET DAILY  Quantity: 90 tab(s)  Days Supply: 0  Refills: 3  Substitutions Allowed  Notes from Pharmacy:  ------------------------------------------Medication Refill    PCP:  [facility]    Name of med requested:  Losartan    Date of last office visit and reason:  2/18/21 HTN/DM TFS  Date of last Med Check / Px:   2/18/21    Date of last labs pertaining to med:  2/18/21    RTC order in chart:  yes, due for visit    For Protocol refill, has patient been contacted:

## 2022-02-15 NOTE — NURSING NOTE
Comprehensive Intake Entered On:  12/28/2019 11:52 AM CST    Performed On:  12/28/2019 11:47 AM CST by Sarina Funes CMA               Summary   Chief Complaint :   c/o sinus pressure/congestion for the past couple of days, poss sinus infection   Weight Measured :   254.6 lb(Converted to: 254 lb 10 oz, 115.48 kg)    Height Measured :   76 in(Converted to: 6 ft 4 in, 193.04 cm)    Body Mass Index :   30.99 kg/m2 (HI)    Body Surface Area :   2.49 m2   Systolic Blood Pressure :   120 mmHg   Diastolic Blood Pressure :   68 mmHg   Mean Arterial Pressure :   85 mmHg   Peripheral Pulse Rate :   84 bpm   BP Site :   Right arm   Pulse Site :   Radial artery   BP Method :   Manual   HR Method :   Electronic   Temperature Tympanic :   97.7 DegF(Converted to: 36.5 DegC)  (LOW)    Oxygen Saturation :   93 % (LOW)    Sarina Funes CMA - 12/28/2019 11:47 AM CST   Health Status   Allergies Verified? :   Yes   Medication History Verified? :   Yes   Medical History Verified? :   No   Pre-Visit Planning Status :   Not completed   Tobacco Use? :   Never smoker   Sarina Funes CMA - 12/28/2019 11:47 AM CST   Consents   Consent for Immunization Exchange :   Consent Granted   Consent for Immunizations to Providers :   Consent Granted   Sarina Funes CMA - 12/28/2019 11:47 AM CST   Problems   (As Of: 12/28/2019 11:52:02 AM CST)   Problems(Active)    Dyslipidemia (SNOMED CT  :5888330152 )  Name of Problem:   Dyslipidemia ; Recorder:   Rich Glass MD; Confirmation:   Confirmed ; Classification:   Medical ; Code:   8463296268 ; Contributor System:   [employer] ; Last Updated:   9/26/2014 2:44 PM CDT ; Life Cycle Date:   9/26/2014 ; Life Cycle Status:   Active ; Responsible Provider:   Rich Glass MD; Vocabulary:   SNOMED CT        Gout (SNOMED CT  :232283091 )  Name of Problem:   Gout ; Recorder:   Rich Glass MD; Confirmation:   Confirmed ; Classification:   Medical ; Code:   [id number] ; Contributor System:   [employer] ;  Last Updated:   9/26/2014 2:43 PM CDT ; Life Cycle Date:   9/26/2014 ; Life Cycle Status:   Active ; Responsible Provider:   Rich lGass MD; Vocabulary:   SNOMED CT        HTN (hypertension) (SNOMED CT  :0577672607 )  Name of Problem:   HTN (hypertension) ; Recorder:   Marcus Wolff MD; Confirmation:   Confirmed ; Classification:   Medical ; Code:   8339094650 ; Contributor System:   [employer] ; Last Updated:   7/11/2018 8:17 AM CDT ; Life Cycle Status:   Active ; Responsible Provider:   Marcus Wolff MD; Vocabulary:   SNOMED CT        Obesity (SNOMED CT  :7869986285 )  Name of Problem:   Obesity ; Recorder:   SYSTEM; Confirmation:   Probable ; Classification:   Medical ; Code:   1617305559 ; Last Updated:   3/30/2015 1:55 PM CDT ; Life Cycle Date:   5/2/2014 ; Life Cycle Status:   Active ; Vocabulary:   SNOMED CT        Seasonal allergies (SNOMED CT  :090869815 )  Name of Problem:   Seasonal allergies ; Recorder:   Amberly Leiva; Confirmation:   Confirmed ; Classification:   Medical ; Code:   115196625 ; Contributor System:   [employer] ; Last Updated:   5/12/2017 12:00 PM CDT ; Life Cycle Date:   5/12/2017 ; Life Cycle Status:   Active ; Vocabulary:   SNOMED CT        Type 2 diabetes mellitus (SNOMED CT  :103177145 )  Name of Problem:   Type 2 diabetes mellitus ; Recorder:   Sandra Nice; Confirmation:   Confirmed ; Classification:   Medical ; Code:   233343238 ; Contributor System:   [employer] ; Last Updated:   7/10/2018 7:26 PM CDT ; Life Cycle Date:   7/10/2018 ; Life Cycle Status:   Active ; Vocabulary:   SNOMED CT          Meds / Allergies   (As Of: 12/28/2019 11:52:02 AM CST)   Allergies (Active)   Dymatap  Estimated Onset Date:   Unspecified ; Reactions:   skin ulcer ; Created By:   Sandra Nice; Reaction Status:   Active ; Category:   Drug ; Substance:   Dymatap ; Type:   Allergy ; Updated By:   Sandra iNce; Reviewed Date:   12/28/2019 11:50 AM CST        Medication List    (As Of: 12/28/2019 11:52:02 AM CST)   Prescription/Discharge Order    atorvastatin  :   atorvastatin ; Status:   Prescribed ; Ordered As Mnemonic:   atorvastatin 80 mg oral tablet ; Simple Display Line:   80 mg, 1 tab(s), po, daily, 90 tab(s), 1 Refill(s) ; Ordering Provider:   Marcus Wolff MD; Catalog Code:   atorvastatin ; Order Dt/Tm:   6/4/2019 5:25:25 PM CDT          losartan  :   losartan ; Status:   Prescribed ; Ordered As Mnemonic:   losartan 25 mg oral tablet ; Simple Display Line:   1 tab(s), Oral, daily, 30 tab(s), 0 Refill(s) ; Ordering Provider:   Marcus Wolff MD; Catalog Code:   losartan ; Order Dt/Tm:   12/2/2019 12:53:01 PM CST          metFORMIN  :   metFORMIN ; Status:   Prescribed ; Ordered As Mnemonic:   metFORMIN 500 mg oral tablet ; Simple Display Line:   500 mg, 1 tab(s), PO, BID, 180 tab(s), 1 Refill(s) ; Ordering Provider:   Marcus Wolff MD; Catalog Code:   metFORMIN ; Order Dt/Tm:   6/4/2019 5:25:26 PM CDT          Miscellaneous Rx Supply  :   Miscellaneous Rx Supply ; Status:   Prescribed ; Ordered As Mnemonic:   CPAP Machine ; Simple Display Line:   See Instructions, Use as directed, 1 EA, 0 Refill(s) ; Ordering Provider:   Marcus Wolff MD; Catalog Code:   Miscellaneous Rx Supply ; Order Dt/Tm:   6/4/2019 5:05:24 PM CDT          Miscellaneous Rx Supply  :   Miscellaneous Rx Supply ; Status:   Prescribed ; Ordered As Mnemonic:   one touch verio test strips ; Simple Display Line:   See Instructions, Test 3x/ day, 300 EA, 3 Refill(s) ; Ordering Provider:   Marcus Wolff MD; Catalog Code:   Miscellaneous Rx Supply ; Order Dt/Tm:   12/13/2018 8:33:34 AM CST          Miscellaneous Rx Supply  :   Miscellaneous Rx Supply ; Status:   Prescribed ; Ordered As Mnemonic:   Replacement CPAP +16cm H2o ; Simple Display Line:   See Instructions, Heated humidifier, heated tubing, filters, nasal or full face mask of choice with headgear.  Replacement cushions and supplies as  needed.  Months = 99 (Lifetime), 1 EA, 0 Refill(s) ; Ordering Provider:   Marcus Wolff MD; Catalog Code:   Miscellaneous Rx Supply ; Order Dt/Tm:   6/4/2019 5:01:22 PM CDT

## 2022-02-15 NOTE — NURSING NOTE
Depression Screening Entered On:  2/26/2020 5:54 PM CST    Performed On:  2/26/2020 5:53 PM CST by Mariel Chin               Depression Screening   Little Interest - Pleasure in Activities :   Nearly every day   Feeling Down, Depressed, Hopeless :   Not at all   Initial Depression Screen Score :   3    Trouble Falling or Staying Asleep :   Nearly every day   Feeling Tired or Little Energy :   Not at all   Poor Appetite or Overeating :   Several days   Feeling Bad About Yourself :   Not at all   Trouble Concentrating :   Not at all   Moving or Speaking Slowly :   Not at all   Thoughts Better Off Dead or Hurting Self :   Not at all   Detailed Depression Screen Score :   4    Total Depression Screen Score :   7    JAMA Difficulty with Work, Home, Others :   Not difficult at all   Mariel Chin - 2/26/2020 5:53 PM CST

## 2022-02-15 NOTE — NURSING NOTE
CAGE Assessment Entered On:  2/26/2020 5:53 PM CST    Performed On:  2/26/2020 5:53 PM CST by Mariel Chin               Assessment   Have you ever felt you should cut down on your drinking :   No   Have people annoyed you by criticizing your drinking :   No   Have you ever felt bad or guilty about your drinking :   No   Have you ever taken a drink first thing in the morning to steady your nerves or get rid of a hangover (Eye-opener) :   No   CAGE Score :   0    Mariel Chin - 2/26/2020 5:53 PM CST

## 2022-02-15 NOTE — PROGRESS NOTES
Chief Complaint    c/o sinus pressure/congestion for the past couple of days, poss sinus infection  History of Present Illness      Patient is a 59-year-old male who complains of sinus pain and pressure especially the left maxillary sinus area for 2 to 3 days.      He has a positive history of sinus infections.  He has been on CPAP for about 10 years and has had much fewer sinus infections since he has been using the CPAP.       He has had cold symptoms for 9 to 10 days.  And then the sinus symptoms really kicked in 2 to 3 days ago.  He is also noticing some blood with the mucus.       No fever or chills, no sore throat, no postnasal drip.       Positive headache.  Occasional cough.          Review of Systems      Negative except as listed in HPI.  Physical Exam   Vitals & Measurements    T: 97.7   F (Tympanic)  HR: 84(Peripheral)  BP: 120/68  SpO2: 93%     HT: 76 in  WT: 254.6 lb  BMI: 30.99       Vitals noted and within normal limits.      Patient is alert, oriented and in no acute distress.      Eyes: conjunctiva not injected.      Ears: canals patent, TMs intact, no erythema and no bulging      Nose: nasal mucosa not swollen.      Mouth: mucous membranes pink and moist, pharynx not erythematous      Neck is supple with no lymphadenopathy      Heart: regular rate and rhythm with no murmur      Lungs: clear to auscultation bilaterally  Assessment/Plan       Sinusitis (J32.9)         tylenol and ibuprofen prn        Follow up if not improving as anticipated.          Ordered:          amoxicillin-clavulanate, = 1 tab(s), PO, q12hr, x 10 day(s), # 20 tab(s), 0 Refill(s), Type: Acute, Pharmacy: [id number], 1 tab(s) Oral q12 hrs,x10 day(s), (Ordered)           Patient Information     Name:ROSA MAY      Address:      [address] [id number]     Sex:Male     YOB: 1960     [phone]     Emergency Contact:MONICA MAY     MRN:[id number]      FIN:[id number]     Location:[facility]     Date of Service:12/28/2019      Primary Care Physician:       Marcus Wolff MD, [phone]      Attending Physician:       Mayra Watt MD, [phone]  Problem List/Past Medical History    Ongoing     Dyslipidemia     Gout     HTN (hypertension)     Obesity     Seasonal allergies     Type 2 diabetes mellitus    Historical     No qualifying data  Medications    amoxicillin-clavulanate 875 mg-125 mg oral tablet, 1 tab(s), Oral, q12 hrs    atorvastatin 80 mg oral tablet, 80 mg= 1 tab(s), Oral, daily, 1 refills    CPAP Machine, See Instructions    losartan 25 mg oral tablet, 1 tab(s), Oral, daily    metFORMIN 500 mg oral tablet, 500 mg= 1 tab(s), Oral, bid, 1 refills    one touch verio test strips, See Instructions, 3 refills    Replacement CPAP +16cm H2o, See Instructions  Allergies    Dymatap (skin ulcer)  Social History    Smoking Status - 12/28/2019     Never smoker     Alcohol      Current, 6 beers per year, 05/02/2014     Employment/School      Employed, Work/School description: Maintance Supervisor., 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 Spouse/Partner name: Rhoda Kendall., 05/02/2014     Nutrition/Health      Type of diet: Regular., 05/02/2014     Sexual      Sexually active: Yes. Identifies as male, Sexual orientation: Straight or heterosexual., 07/16/2018     Substance Abuse      Never, 05/02/2014     Tobacco      Never, 05/02/2014  Family History    Autism: Son.    Bladder cancer..: Father.    Diabetes mellitus type I: Son.    Stroke: Mother.    Sister: History is negative    Brother: History is negative  Immunizations      Vaccine Date Status          influenza virus vaccine, inactivated 12/13/2018 Given          pneumococcal (PCV13) 07/11/2018 Given          tetanus/diphth/pertuss (Tdap) adult/adol 12/18/2009 Recorded

## 2022-02-15 NOTE — TELEPHONE ENCOUNTER
Entered by Perlita Benavidez on August 03, 2020 9:43:08 AM CDT  ---------------------  From: Perlita Benavidez   To: [facility]    Sent: 8/3/2020 9:43:08 AM CDT  Subject: Medication Management     ** Submitted: **  Order:losartan (losartan 25 mg oral tablet)  1 tab(s)  Oral  daily  Qty:  30 tab(s)        Refills:  0          Substitutions Allowed     Route To Pharmacy - [facility]    Signed by Perlita Benavidez  8/3/2020 2:42:00 PM [place]    ** Submitted: **  Complete:losartan (losartan 25 mg oral tablet)   Signed by Perlita Benavidez  8/3/2020 2:43:00 PM [facility]    ** Not Approved:  **  losartan (LOSARTAN TABS 25MG)  TAKE 1 TABLET DAILY  Qty:  90 tab(s)        Days Supply:  0        Refills:  3          Substitutions Allowed     Route To Pharmacy - [facility]   Signed by Perlita Benavidez            ------------------------------------------  From: [facility]  To: Marcus Wolff MD  Sent: August 2, 2020 11:47:11 PM CDT  Subject: Medication Management  Due: July 28, 2020 10:28:08 PM CDT     ** On Hold Pending Signature **     Dispensed Drug: losartan (losartan 25 mg oral tablet), TAKE 1 TABLET DAILY  Quantity: 90 tab(s)  Days Supply: 0  Refills: 3  Substitutions Allowed  Notes from Pharmacy:  ------------------------------------------Med Refill      Date of last office visit and reason:  02/26/2020; Annual px                                   Date of last Med Check / Px:   02/26/2020  Date of last labs pertaining to med:  02/26/2020    RTC order in chart:  yes, due mid-end August 2020    For Protocol refill, has patient been contacted:  Message sent to appointment pool to call and schedule appt w/ TFS.

## 2022-02-15 NOTE — TELEPHONE ENCOUNTER
---------------------  From: Sandra Nice   To: [facility]FS Message Pool ([id number]_[place] - [place]);     Sent: 7/17/2019 2:28:56 PM CDT  Subject: Result: Ultrasound     Left message for patient to call back at: 2:28pm regarding recent ultrasound.  Per TFS, ultrasound shows benign cysts.  He can consider urology referral if he would like them removed.Pt informed. Pt says he will not worry about it for now.

## 2022-02-15 NOTE — TELEPHONE ENCOUNTER
Entered by Lina Elias CMA on December 02, 2019 12:53:22 PM CST  ---------------------  From: Lina Elias CMA   To: [facility]    Sent: 12/2/2019 12:53:22 PM CST  Subject: Medication Management     ** Submitted: **  Order:losartan (losartan 25 mg oral tablet)  1 tab(s)  Oral  daily  Qty:  30 tab(s)        Refills:  0          Substitutions Allowed     Route To Pharmacy - [facility]    Signed by Lina Elias CMA  12/2/2019 12:53:00 PM    ** Submitted: **  Complete:losartan (losartan 25 mg oral tablet)   Signed by Lina Elias CMA  12/2/2019 12:53:00 PM    ** Not Approved:  **  losartan (LOSARTAN TABS 25MG)  TAKE 1 TABLET DAILY  Qty:  90 tab(s)        Days Supply:  0        Refills:  4          Substitutions Allowed     Route To Pharmacy - [facility]   Signed by Lina Elias CMA            ------------------------------------------  From: [facility]  To: Marcus Wolff MD  Sent: December 1, 2019 6:32:12 AM CST  Subject: Medication Management  Due: December 2, 2019 6:32:12 AM CST    ** On Hold Pending Signature **  Drug: losartan (losartan 25 mg oral tablet)  1 TAB(S) ORAL DAILY  Quantity: 90 tab(s)  Days Supply: 0  Refills: 1  Substitutions Allowed  Notes from Pharmacy:     Dispensed Drug: losartan (losartan 25 mg oral tablet)  TAKE 1 TABLET DAILY  Quantity: 90 tab(s)  Days Supply: 0  Refills: 4  Substitutions Allowed  Notes from Pharmacy:   ------------------------------------------Med Refill      Date of last office visit and reason:  6/4/19; HTN / DM      Date of last Med Check / Px:   6/4/19  Date of last labs pertaining to med:  6/4/19    RTC order in chart:  yes; this month    For Protocol refill, has patient been contacted:  msg sent to pharmacy

## 2022-02-15 NOTE — LETTER
(Inserted Image. Unable to display)            December 22, 2021        ROSA MAY       [address] [place] [address]        Dear ROSA,    Thank you for selecting [facility]  for your healthcare needs.  Below you will find the results of the recent tests done at our clinic.     All labs acceptable.      Result Name Current Result Reference Range   Cholesterol (mg/dL)  193 12/21/2021  - <200   HDL (mg/dL)  46 12/21/2021 > OR = 40 -    Triglyceride (mg/dL) ((H)) 281 12/21/2021  - <150   LDL ((H)) 107 12/21/2021    Ur Microalbumin/Creatinine Ratio  5 12/21/2021  - <30   Glucose Level (mg/dL) ((H)) 135 12/21/2021 65 - 99   Creatinine Level (mg/dL)  1.12 12/21/2021 0.70 - 1.25   Potassium Level (mmol/L)  4.5 12/21/2021 3.5 - 5.3   Hgb A1c ((H)) 6.7 12/21/2021  - <5.7       Please contact me or my assistant at [phone] if you have any questions.     Sincerely,        Marcus Wolff MD        What do your labs mean?  Below is a glossary of commonly ordered labs:  LDL   Bad Cholesterol   HDL   Good Cholesterol  AST/ALT   Liver Function   Cr/Creatinine   Kidney Function  Microalbumin   Kidney Function  BUN   Kidney Function  PSA   Prostate    TSH   Thyroid Hormone  HgbA1c   Diabetes Test   Hgb (Hemoglobin)   Red Blood Cells

## 2022-02-15 NOTE — LETTER
(Inserted Image. Unable to display)       December 05, 2019      ROSA LALO   [address] [place], [place] [id number]          Dear ROSA,      Thank you for selecting [facility] for your healthcare needs.     Our records indicate you are due for the following services:    Fasting Lab Tests ~ Please do not eat or drink anything 10 hours prior to your scheduled appointment time.  (Water and any medications that you may need are allowed unless directed otherwise.)  Urine Labs ~ Please be prepared to leave a urine specimen for evaluation.    If you had your labs done at another facility or with Direct Access Lab Testing at [facility], please bring in a copy of the results to your next visit, mail a copy, or drop off a copy of your results to your Healthcare Provider.    Diabetic Exam ~ Please bring your glucose meter and/or your blood glucose diary to your appointment.  Hypertension Check ~ Please remember to bring any at-home blood pressure readings with you to your appointment.    You are due for lab work and an office visit; please schedule the lab appointment 1 week before the office visit.  This will assure all results are available to discuss with your Healthcare Provider during your visit.    **It is very helpful if you bring your medication bottles to your appointment.  This assures we have all of your current medications, including strength and dosing information, documented accurately in your medical record.    To schedule an appointment or if you have further questions, please contact your primary clinic:   [facility]       [phone]   [facility]       [phone]              [facility]     [phone]    Powered by [employer]    Sincerely,    Marcus Wolff MD

## 2022-02-15 NOTE — PROGRESS NOTES
Patient:   ROSA MAY            MRN: [id number]            FIN: [id number]               Age:   60 years     Sex:  Male     :  1960   Associated Diagnoses:   Dyslipidemia; HTN (hypertension); Type 2 diabetes mellitus   Author:   Marcus Wolff MD      Visit Information      Date of Service: 2021 07:20 am  Performing Location: [facility]  Encounter#: [id number]      Primary Care Provider (PCP):  Marcus Wolff MD    NPI# [id number]      Referring Provider:  Marcus Wolff MD# [id number]      Chief Complaint   2021 7:27 AM CST    Px        History of Present Illness      Patient is here for 6-month follow-up.  He has been working from home.  He is gained a bit of weight with Covid and being at home.  Blood sugars have been running a bit high yet although his A1c is look good.  He is seeing his eye doctor later today.  He had a recent stye in the right eye.  No low blood sugars.  He said exercising a week ago working on diet a week ago         Review of Systems   Constitutional:  Negative.    Eye:  Negative.    Ear/Nose/Mouth/Throat:  Negative.    Respiratory:  Negative.    Cardiovascular:  Negative.    Gastrointestinal:  Negative.    Genitourinary:  Negative.    Hematology/Lymphatics:  Negative.    Endocrine:  Negative.    Immunologic:  Negative.    Musculoskeletal:  Negative.    Integumentary:  Negative.    Neurologic:  Negative.       Health Status   Allergies:    Allergic Reactions (Selected)  Severity Not Documented  Dymatap (Skin ulcer)   Medications:  (Selected)   Prescriptions  Prescribed  atorvastatin 80 mg oral tablet: = 1 tab(s), Oral, daily, # 90 tab(s), 1 Refill(s), Type: Maintenance, Pharmacy: [facility], 1 tab(s) Oral daily, 76, in, 21 7:27:00 CST, Height Measured, 273, lb, 21 7:27:00 CST, Weight Measured  losartan 25 mg oral tablet: = 1 tab(s) ( 25 mg ), Oral, daily, # 90 tab(s), 1 Refill(s), Type: Maintenance,  Pharmacy: [facility], 1 tab(s) Oral daily, 76, in, 02/18/21 7:27:00 CST, Height Measured, 273, lb, 02/18/21 7:27:00 CST, Weight Measured  metFORMIN 500 mg oral tablet: = 1 tab(s), Oral, bid, # 180 tab(s), 1 Refill(s), Type: Maintenance, Pharmacy: [facility], 1 tab(s) Oral bid, 76, in, 02/18/21 7:27:00 CST, Height Measured, 273, lb, 02/18/21 7:27:00 CST, Weight Measured  one touch verio test strips: one touch verio test strips, See Instructions, Instructions: Test 3x/ day, Supply, # 300 EA, 3 Refill(s), Type: Maintenance, [facility]acy: [facility], Test 3x/ day,    Medications          *denotes recorded medication          one touch verio test strips: See Instructions, Test 3x/ day, 300 EA, 3 Refill(s).          atorvastatin 80 mg oral tablet: 1 tab(s), Oral, daily, 90 tab(s), 1 Refill(s).          losartan 25 mg oral tablet: 25 mg, 1 tab(s), Oral, daily, 90 tab(s), 1 Refill(s).          metFORMIN 500 mg oral tablet: 1 tab(s), Oral, bid, 180 tab(s), 1 Refill(s).       Problem list:    All Problems (Selected)  Dyslipidemia / SNOMED CT 2167409566 / Confirmed  Gout / SNOMED CT 962314866 / Confirmed  HTN (hypertension) / SNOMED CT 2316575404 / Confirmed  Obesity / SNOMED CT 5133371953 / Probable  Seasonal allergies / SNOMED CT 290705644 / Confirmed  Type 2 diabetes mellitus / SNOMED CT 438707159 / Confirmed      Histories   Past Medical History:    Active  Seasonal allergies (121035730)   Family History:    Diabetes mellitus type I  Son (Daniel)  Stroke  Mother (Mariela)  Autism  Son (Daniel)  Bladder cancer..  Father (Thierno)     Procedure history:    Screening for colon cancer (SNOMED CT 5348755162) on 7/15/2019 at 59 Years.  Comments:  2/26/2020 9:50 AM CST - Sandra Niceogjason negative   Social History:        Electronic Cigarette/Vaping Assessment            Electronic Cigarette Use: Never.      Alcohol Assessment            Current, Beer (12 oz), 1-2  times per year, 1 drinks/episode average.  Ready to change: No.      Tobacco Assessment            Never (less than 100 in lifetime)      Substance Abuse Assessment            Never      Employment and Education Assessment            Employed      Home and Environment Assessment            Marital status: .  Spouse/Partner name: Rhoda Kendall.  2 children.  Living situation:               Home/Independent.  Injuries/Abuse/Neglect in household: No.  Feels unsafe at home: No.  Family/Friends               available for support: Yes.      Nutrition and Health Assessment            Type of diet: Low carbohydrate.  Wants to lose weight: Yes.  Sleeping concerns: No.  Feels highly stressed:               Yes.      Exercise and Physical Activity Assessment: Occasional exercise      Sexual Assessment            Sexually active: Yes.  Identifies as male, Sexual orientation: Straight or heterosexual.  History of STD: No.               History of sexual abuse: No.        Physical Examination   Vital Signs   2/18/2021 7:27 AM CST Temperature Tympanic 97.6 DegF  LOW    Peripheral Pulse Rate 75 bpm    HR Method Electronic    Systolic Blood Pressure 122 mmHg    Diastolic Blood Pressure 77 mmHg    Mean Arterial Pressure 92 mmHg    BP Method Electronic    Vital Signs Comments thigh cuff used      Measurements from flowsheet : Measurements   2/18/2021 7:27 AM CST Height Measured - Standard 76 in    Weight Measured - Standard 273.0 lb    BSA 2.57 m2    Body Mass Index 33.23 kg/m2  HI      General:  Alert and oriented, No acute distress.    HENT:  Tympanic membranes are clear.    Neck:  Supple, Non-tender, No carotid bruit, No lymphadenopathy, No thyromegaly.    Respiratory:  Lungs are clear to auscultation, Respirations are non-labored.    Cardiovascular:  Normal rate, Regular rhythm, No murmur, Normal peripheral perfusion, No edema.    Gastrointestinal:  Soft, Non-tender, No organomegaly.    Integumentary:  No rash.     Neurologic:  Alert, Oriented, No focal deficits, Cranial Nerves II-XII are grossly intact.       Health Maintenance      Recommendations     Pending (in the next year)        OverDue           DM - HgbA1c due  11/14/20  and every 3  month(s)        Due            Colorectal Cancer Screen (Colonoscopy) (Male) due  02/18/21  Variable frequency           Colorectal Cancer Screen (Occult Blood) (Male) due  02/18/21  Variable frequency           Colorectal Cancer Screen (Sigmoidoscopy) (Male) due  02/18/21  Variable frequency           DM - Communication with Managing Provider due  02/18/21  and every 1  year(s)           DM - Eye Exam due  02/18/21  and every 1  year(s)           Hepatitis C Screen [id number] (Male) due  02/18/21  One-time only           Lung Cancer Screen (Male) due  02/18/21  and every 1  year(s)           Zoster/Shingles Vaccine due  02/18/21  One-time only        Near Due            DM - Foot Exam near due  02/26/21  and every 1  year(s)           DM - Microalbumin near due  02/26/21  and every 1  year(s)           Lipid Disorders Screen (Male) near due  02/26/21  and every 1  year(s)           Alcohol Misuse Screen near due  02/26/21  and every 1  year(s)           Depression Screen (Male) near due  02/26/21  and every 1  year(s)        Due In Future            COVID-19 Vaccine (Moderna) Dose 2 not due until  03/17/21  One-time only           Influenza Vaccine not due until  09/01/21  and every 1  year(s)     Satisfied (in the past 1 year)        Satisfied            Alcohol Misuse Screen on  02/26/20.           Alcohol Misuse Screen on  02/26/20.           Body Mass Index Check on  02/18/21.           Body Mass Index Check on  08/14/20.           Body Mass Index Check on  02/26/20.           COVID-19 Vaccine (Moderna) Dose 1 on  02/17/21.           DM - Foot Exam on  02/26/20.           DM - HgbA1c on  08/14/20.           DM - HgbA1c on  02/26/20.           DM - Microalbumin on  02/26/20.            DM - Microalbumin on  02/26/20.           Depression Screen (Male) on  02/26/20.           Depression Screen (Male) on  02/26/20.           Depression Screen (Male) on  02/26/20.           High Blood Pressure Screen (Male) on  02/18/21.           High Blood Pressure Screen (Male) on  08/14/20.           High Blood Pressure Screen (Male) on  02/26/20.           Influenza Vaccine on  10/30/20.           Lipid Disorders Screen (Male) on  02/26/20.           Lipid Disorders Screen (Male) on  02/26/20.           Lipid Disorders Screen (Male) on  02/26/20.           Lipid Disorders Screen (Male) on  02/26/20.           Obesity Screen and Counseling (Male) on  02/18/21.           Obesity Screen and Counseling (Male) on  08/14/20.           Obesity Screen and Counseling (Male) on  02/26/20.           Tetanus Vaccine on  02/26/20.           Tobacco Use Screen (Male) on  02/18/21.           Tobacco Use Screen (Male) on  08/14/20.           Tobacco Use Screen (Male) on  02/26/20.          Review / Management   Results review      Impression and Plan   Diagnosis     Dyslipidemia (NYS68-IO E78.5).     HTN (hypertension) (FBI38-LY I10).     Type 2 diabetes mellitus (GTE10-QJ E11.9).     Course:  Improving.    Plan:    fu 6 mo, 1.  Diabetes mellitus stable on metformin and a baby aspirin every day.  2.  Hyperlipidemia controlled on atorvastatin daily  3.  Hypertension well controlled with losartan daily   #4 gout no recent flares  5.  Obesity will be working on weight loss    .    Patient Instructions:       Counseled: Patient, Regarding diagnosis, Regarding treatment, Regarding medications, Diet, Activity.

## 2022-02-15 NOTE — LETTER
(Inserted Image. Unable to display)   February 19, 2021      ROSA LALO       [address] [place], [place] [id number]        Dear ROSA,    Thank you for selecting [facility] for your healthcare needs.  Below you will find the results of the recent tests done at our clinic.     All labs acceptable.  We will discuss this at your next visit.        Result Name Current Result Previous Result Reference Range   Potassium Level (mmol/L)  4.3 2/18/2021  4.0 2/26/2020 3.5 - 5.3   Glucose Level (mg/dL) ((H)) 141 2/18/2021  99 2/26/2020 65 - 99   Creatinine Level (mg/dL)  1.13 2/18/2021  1.09 2/26/2020 0.70 - 1.25   Hgb A1c ((H)) 6.7 2/18/2021 ((H)) 6.1 8/14/2020  - <5.7   Cholesterol (mg/dL)  177 2/18/2021  177 2/26/2020  - <200   HDL (mg/dL)  49 2/18/2021  46 2/26/2020 > OR = 40 -    LDL ((H)) 102 2/18/2021  99 2/26/2020    Triglyceride (mg/dL) ((H)) 164 2/18/2021 ((H)) 208 2/26/2020  - <150   Ur Microalbumin/Creatinine Ratio  5 2/18/2021  5 2/26/2020  - <30       Please contact me or my assistant at [phone] if you have any questions.     Sincerely,        Marcus Wolff MD        What do your labs mean?  Below is a glossary of commonly ordered labs:  LDL   Bad Cholesterol   HDL   Good Cholesterol  AST/ALT   Liver Function   Cr/Creatinine   Kidney Function  Microalbumin   Kidney Function  BUN   Kidney Function  PSA   Prostate    TSH   Thyroid Hormone  HgbA1c   Diabetes Test   Hgb (Hemoglobin)   Red Blood Cells

## 2022-02-15 NOTE — TELEPHONE ENCOUNTER
Entered by Barbara Pearce MA on February 07, 2021 9:58:15 AM CST  ---------------------  From: Barbara Pearce MA   To: [facility]    Sent: 2/7/2021 9:58:15 AM CST  Subject: Medication Management     ** Submitted: **  Order:losartan (losartan 25 mg oral tablet)  1 tab(s)  Oral  daily  Qty:  90 tab(s)        Refills:  0          Substitutions Allowed     Route To Pharmacy - [facility]    Signed by Barbara Pearce MA  2/7/2021 3:57:00 PM [facility]    ** Submitted: **  Complete:losartan (losartan 25 mg oral tablet)   Signed by Barbara Pearce MA  2/7/2021 3:58:00 PM [facility]    ** Not Approved:  **  losartan (LOSARTAN TABS 25MG)  TAKE 1 TABLET DAILY  Qty:  90 tab(s)        Days Supply:  0        Refills:  3          Substitutions Allowed     Route To Pharmacy - [facility]   Signed by Barbara Pearce MA            ** Patient matched by Barbara Pearce MA on 2/7/2021 9:56:30 AM CST **      ------------------------------------------  From: [facility]  To: Marcus Wolff MD  Sent: February 7, 2021 6:09:19 AM CST  Subject: Medication Management  Due: January 30, 2021 4:21:54 PM CST     ** On Hold Pending Signature **     Drug: losartan (losartan 25 mg oral tablet), TAKE 1 TABLET DAILY  Quantity: 90 tab(s)  Days Supply: 0  Refills: 3  Substitutions Allowed  Notes from Pharmacy:     Dispensed Drug: losartan (losartan 25 mg oral tablet), TAKE 1 TABLET DAILY  Quantity: 90 tab(s)  Days Supply: 0  Refills: 3  Substitutions Allowed  Notes from Pharmacy:  ------------------------------------------Med Refill      Date of last office visit and reason:  8/14/2020 w/ TFS for CDM f/u      Date of last Med Check / Px:   _  Date of last labs pertaining to med:  8/14/2020    Note:  _    RTC order in chart:  RTC now due, reminder letters being sent    For Protocol refill, has patient been contacted:  message sent to pharmacy

## 2022-02-15 NOTE — LETTER
(Inserted Image. Unable to display)   August 19, 2021  ROSA [address]O   [address] [place] [address]        Dear ROSA,    Thank you for selecting [facility] for your healthcare needs.    Our records indicate you are due for the following services:     Diabetic Exam ~ Please bring your glucose meter and/or your blood glucose diary to your appointment.  Hypertension check ~ Please remember to bring your at-home blood pressure readings with you to your appointment.   Non-Fasting Labs    If you had your labs done at another facility or with Direct Access Lab Testing at [facility], please bring in a copy of the results to your next visit, mail a copy, or drop off a copy of your results to your Healthcare Provider.     (FYI   Regarding office visits: In some instances, a video visit or telephone visit may be offered as an option.)    To schedule an appointment or if you have further questions, please contact your clinic at [phone].    Powered by [employer]    Sincerely,    Marcus Wolff MD

## 2022-02-15 NOTE — NURSING NOTE
Pt appears on TFS chronic disease panel as out of parameters for diabetes.  Pt is on no medication and had been DX as pre-diabetes.  Now change to DM.  Pt was seen on 5/12/2017 for Wellness visit with A1C of 7.6.  RTC placed for pt to see Jihan English and provider.  Called to pt at [phone] [facility] to call and make appt with both provider and Jihan English per provider result letter.  Pt is overdue on follow up with both.

## 2022-02-15 NOTE — PROGRESS NOTES
Patient:   ROSA MAY            MRN: [id number]            FIN: [id number]               Age:   59 years     Sex:  Male     :  1960   Associated Diagnoses:   Annual exam; Central Sleep Apnea; Lipids abnormal; HTN (hypertension); Type 2 diabetes mellitus   Author:   Marcus Wolff MD      Visit Information      Date of Service: 2020 07:53 am  Performing Location: [facility]  Encounter#: [id number]      Primary Care Provider (PCP):  Marcus Wolff MD# [id number]      Referring Provider:  Marcus Wolff MD# [id number]      Chief Complaint   2020 7:55 AM CST    Px     Routine Health Care Visit      History of Present Illness   Doing well no concerns             The patient presents for a general exam.  The patient's general health status is described as good.  The patient's diet is described as balanced.  Exercise: occasional.  Complaint:  uses CPAP machine  every day and gets great benefit  Doing well with his Lipitor  BP usually controlled  Glucose low 100s  eye check yearly.  Additional pertinent history: occasional caffeine use, tobacco use none and no alcohol use.     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        Review of Systems   Constitutional:  Negative.    Eye:  Negative.    Ear/Nose/Mouth/Throat:  Negative.    Respiratory:  Negative.    Cardiovascular:  Negative.    Gastrointestinal:  Negative.    Genitourinary:  Negative.    Hematology/Lymphatics:  Negative.    Endocrine:  Negative.    Immunologic:  Negative.    Musculoskeletal:  Negative.    Integumentary:  Negative.    Neurologic:  Negative.    Psychiatric:  Negative.    All other systems reviewed and negative      Health Status   Allergies:    Allergic Reactions (Selected)  Severity Not Documented  Dymatap (Skin ulcer)   Medications:  (Selected)   Prescriptions  Prescribed  atorvastatin 80 mg oral tablet: = 1 tab(s) ( 80  mg ), po, daily, # 90 tab(s), 1 Refill(s), Type: Maintenance, Pharmacy: [facility], 1 tab(s) Oral daily  losartan 25 mg oral tablet: = 1 tab(s), Oral, daily, # 30 tab(s), 0 Refill(s), Type: Maintenance, Pharmacy: [facility], Needs appt for further refills  metFORMIN 500 mg oral tablet: = 1 tab(s) ( 500 mg ), PO, BID, # 180 tab(s), 1 Refill(s), Type: Maintenance, Pharmacy: [facility], 1 tab(s) Oral bid  one touch verio test strips: one touch verio test strips, See Instructions, Instructions: Test 3x/ day, Supply, # 300 EA, 3 Refill(s), Type: Maintenance, Pharmacy: [facility], Test 3x/ day,    Medications          *denotes recorded medication          one touch verio test strips: See Instructions, Test 3x/ day, 300 EA, 3 Refill(s).          atorvastatin 80 mg oral tablet: 80 mg, 1 tab(s), po, daily, 90 tab(s), 1 Refill(s).          losartan 25 mg oral tablet: 1 tab(s), Oral, daily, 30 tab(s), 0 Refill(s).          metFORMIN 500 mg oral tablet: 500 mg, 1 tab(s), PO, BID, 180 tab(s), 1 Refill(s).       Problem list:    All Problems (Selected)  Dyslipidemia / SNOMED CT 5962784318 / Confirmed  Gout / SNOMED CT 816103360 / Confirmed  HTN (hypertension) / SNOMED CT 7772636948 / Confirmed  Obesity / SNOMED CT 6093911722 / Probable  Seasonal allergies / SNOMED CT 459917718 / Confirmed  Type 2 diabetes mellitus / SNOMED CT 896557021 / Confirmed      Histories   Past Medical History:    Active  Seasonal allergies (853437045)   Family History:    Diabetes mellitus type I  Son (Daniel)  Stroke  Mother (Mariela)  Autism  Son (Daniel)  Bladder cancer..  Father (Thierno)     Procedure history:    No active procedure history items have been selected or recorded.   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      Home and Environment Assessment            Marital status: .  Spouse/Partner name: Rhoda Kendall.      Nutrition and Health Assessment            Type of diet: Regular.      Sexual Assessment            Sexually active: Yes.  Identifies as male, Sexual orientation: Straight or heterosexual.        Physical Examination   Vital Signs   2/26/2020 7:55 AM CST Temperature Tympanic 97.2 DegF  LOW    Peripheral Pulse Rate 80 bpm    HR Method Electronic    Systolic Blood Pressure 135 mmHg  HI    Diastolic Blood Pressure 82 mmHg  HI    Mean Arterial Pressure 100 mmHg    BP Method Electronic      Measurements from flowsheet : Measurements   2/26/2020 7:55 AM CST Height Measured - Standard 76 in    Weight Measured - Standard 253.2 lb    BSA 2.48 m2    Body Mass Index 30.82 kg/m2  HI      General:  Alert and oriented.    Eye:  Pupils are equal, round and reactive to light, Normal conjunctiva.    HENT:  Normocephalic, Tympanic membranes are clear, Oral mucosa is moist.    Neck:  Supple, Non-tender, No lymphadenopathy, No thyromegaly.    Respiratory:  Breath sounds are equal, Symmetrical chest wall expansion.         Respirations: Are within normal limits.         Pattern: Regular.         Breath sounds: Bilateral, Within normal limits.    Cardiovascular:  Normal rate, Regular rhythm, No murmur, Good pulses equal in all extremities, Normal peripheral perfusion, No edema.    Breast:  No mass.         Lymph nodes: Within normal limits.    Gastrointestinal:  Soft, Non-tender, Non-distended, Normal bowel sounds.    Musculoskeletal:  No tenderness, No swelling.    Integumentary:  Warm, Dry.    Feet:  Normal by visual exam, Normal pulses, Sensation intact, By monofilament exam.    Neurologic:  No focal deficits.    Cognition and Speech:  Oriented, Speech clear and coherent.    Psychiatric:  Appropriate mood & affect, Normal judgment.        Health Maintenance      Recommendations     Pending (in the next year)        OverDue           Alcohol Misuse Screen (Male) due  05/02/15  and every 1  year(s)           Depression Screen (Male) due  05/12/18  and every 1  year(s)           DM - Microalbumin due  07/11/19  and every 1  year(s)           Lipid Disorders Screen (Male) due  07/11/19  and every 1  year(s)           DM - Foot Exam due  07/11/19  and every 1  year(s)           Influenza Vaccine due  08/31/19  and every 1  year(s)           DM - HgbA1c due  09/04/19  and every 3  month(s)           Tetanus Vaccine due  12/18/19  and every 10  year(s)        Due            Aspirin Therapy for Prevention of CVD (Male) due  02/26/20  and every 5  year(s)           Colorectal Cancer Screen (Occult Blood) (Male) due  02/26/20  Variable frequency           Colorectal Cancer Screen (Colonoscopy) (Male) due  02/26/20  Variable frequency           Colorectal Cancer Screen (Sigmoidoscopy) (Male) due  02/26/20  Variable frequency           DM - Communication with Managing Provider due  02/26/20  and every 1  year(s)           DM - Eye Exam due  02/26/20  and every 1  year(s)           Hepatitis C Screen 5512-5489 (Male) due  02/26/20  One-time only           Lung Cancer Screen (Male) due  02/26/20  and every 1  year(s)        Due In Future            Type 2 Diabetes Mellitus Screen (Male) not due until  06/04/20  and every 1  year(s)     Satisfied (in the past 1 year)        Satisfied            Body Mass Index Check (Male) on  02/26/20.           Body Mass Index Check (Male) on  12/28/19.           Body Mass Index Check (Male) on  06/04/19.           DM - HgbA1c on  06/04/19.           High Blood Pressure Screen (Male) on  02/26/20.           High Blood Pressure Screen (Male) on  12/28/19.           High Blood Pressure Screen (Male) on  06/04/19.           Obesity Screen and Counseling (Male) on  02/26/20.           Obesity Screen and Counseling (Male) on   12/28/19.           Obesity Screen and Counseling (Male) on  06/04/19.           Tobacco Use Screen (Male) on  02/26/20.           Tobacco Use Screen (Male) on  12/28/19.           Tobacco Use Screen (Male) on  06/04/19.           Type 2 Diabetes Mellitus Screen (Male) on  06/04/19.          Impression and Plan   Diagnosis     Annual exam (JZA52-IT Z00.00).     Central Sleep Apnea (IEE79-AA G47.30).     Lipids abnormal (MBF53-GY E78.9).     HTN (hypertension) (LBN70-MH I10).     Type 2 diabetes mellitus (EEX04-HL E11.9).     Course:  Progressing as expected.    Plan:  BMI,Weight loss,exercise,diet discussed  Doing well  6 mo fu, fannie.    Patient Instructions:       Counseled: Patient, Regarding diagnosis, Regarding treatment, Regarding medications, Diet, Activity, Verbalized understanding.    Counseled:  Patient, Routine exercise and healthy weight maintenance discussed.    Patient Instructions:  Return to clinic in one year for next routine health visit, or sooner if problems or concerns.

## 2022-02-15 NOTE — PROGRESS NOTES
Patient:   ROSA MAY            MRN: [id number]            FIN: [id number]               Age:   58 years     Sex:  Male     :  1960   Associated Diagnoses:   Annual exam; Central Sleep Apnea; Lipids abnormal; HTN (hypertension); Type 2 diabetes mellitus   Author:   Marcus Wolff MD      Visit Information      Date of Service: 2018 08:00 am  Performing Location: [facility]  Encounter#: [id number]      Primary Care Provider (PCP):  Marcus Wolff MD# [id number]      Referring Provider:  Marcus Wolff MD# [id number]      Chief Complaint   2018 8:05 AM CDT    Px     Routine Health Care Visit      History of Present Illness   Doing well no concerns             The patient presents for a general exam.  The patient's general health status is described as good.  The patient's diet is described as balanced.  Exercise: occasional.  Complaint:  uses CPAP machine  every day and gets great benefit  Doing well with his Lipitor.  Additional pertinent history: occasional caffeine use, tobacco use none and no alcohol use.     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        Review of Systems   Constitutional:  Negative.    Eye:  Negative.    Ear/Nose/Mouth/Throat:  Negative.    Respiratory:  Negative.    Cardiovascular:  Negative.    Gastrointestinal:  Negative.    Genitourinary:  Negative.    Hematology/Lymphatics:  Negative.    Endocrine:  Negative.    Immunologic:  Negative.    Musculoskeletal:  Negative.    Integumentary:  Negative.    Neurologic:  Negative except as documented in history of present illness.    Psychiatric:  Negative.    All other systems reviewed and negative      Health Status   Allergies:    Allergic Reactions (Selected)  Severity Not Documented  Dymatap (Skin ulcer)   Medications:  (Selected)   Prescriptions  Prescribed  CPAP Supplies: CPAP Supplies, See Instructions,  Instructions: heated humidifier, heated tubing, filters, nasal or full face mask of choice with headgear.  Replacement cushions and supplies as needed.   Months=99 (lifetime), Supply, # 1 EA, 0 Refill(s), Type: Mainten...  atorvastatin 80 mg oral tablet: 1 tab(s) ( 80 mg ), po, daily, # 90 tab(s), 1 Refill(s), Type: Maintenance, Pharmacy: [facility], 1 tab(s) Oral daily  losartan 25 mg oral tablet: 1 tab(s) ( 25 mg ), PO, Daily, # 90 tab(s), 0 Refill(s), Type: Maintenance, Pharmacy: [facility], 1 tab(s) Oral daily  metFORMIN 500 mg oral tablet: 1 tab(s) ( 500 mg ), PO, BID, # 180 tab(s), 1 Refill(s), Type: Maintenance, Pharmacy: [facility], 1 tab(s) Oral bid,    Medications          *denotes recorded medication          CPAP Supplies: See Instructions, heated humidifier, heated tubing, filters, nasal or full face mask of choice with headgear.  Replacement cushions and supplies as needed.   Months=99 (lifetime), 1 EA, 0 Refill(s).          atorvastatin 80 mg oral tablet: 80 mg, 1 tab(s), po, daily, 90 tab(s), 1 Refill(s).          losartan 25 mg oral tablet: 25 mg, 1 tab(s), PO, Daily, 90 tab(s), 0 Refill(s).          metFORMIN 500 mg oral tablet: 500 mg, 1 tab(s), PO, BID, 180 tab(s), 1 Refill(s).     Problem list:    All Problems (Selected)  Dyslipidemia / SNOMED CT 1542929496 / Confirmed  Gout / SNOMED CT 984256266 / Confirmed  HTN (hypertension) / SNOMED CT 4494134918 / Confirmed  Obesity / SNOMED CT 7302587970 / Probable  Seasonal allergies / SNOMED CT 748482942 / Confirmed  Type 2 diabetes mellitus / SNOMED CT 249327701 / Confirmed      Histories   Past Medical History:    Active  Seasonal allergies (644262518)   Family History:    Diabetes mellitus type I  Son (Daniel)  Stroke  Mother (Mariela)  Autism  Son (Daniel)  Bladder cancer..  Father (Thierno)     Procedure history:    No active procedure history items have been selected or recorded.   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      Home and Environment Assessment            Marital status: .  Spouse/Partner name: Rhoda Kendall.      Nutrition and Health Assessment            Type of diet: Regular.        Physical Examination   Vital Signs   7/11/2018 8:05 AM CDT Temperature Tympanic 97.5 DegF  LOW    Peripheral Pulse Rate 84 bpm    HR Method Electronic    Systolic Blood Pressure 135 mmHg  HI    Diastolic Blood Pressure 84 mmHg  HI    Mean Arterial Pressure 101 mmHg    BP Method Electronic      Measurements from flowsheet : Measurements   7/11/2018 8:05 AM CDT Height Measured - Standard 76 in    Weight Measured - Standard 254.8 lb    BSA 2.49 m2    Body Mass Index 31.01 kg/m2  HI      General:  Alert and oriented.    Eye:  Pupils are equal, round and reactive to light, Normal conjunctiva.    HENT:  Normocephalic, Tympanic membranes are clear, Oral mucosa is moist.    Neck:  Supple, Non-tender, No lymphadenopathy, No thyromegaly.    Respiratory:  Breath sounds are equal, Symmetrical chest wall expansion.         Respirations: Are within normal limits.         Pattern: Regular.         Breath sounds: Bilateral, Within normal limits.    Cardiovascular:  Normal rate, Regular rhythm, No murmur, Good pulses equal in all extremities, Normal peripheral perfusion, No edema.    Breast:  No mass.         Lymph nodes: Within normal limits.    Gastrointestinal:  Soft, Non-tender, Non-distended, Normal bowel sounds.    Musculoskeletal:  No tenderness, No swelling.    Integumentary:  Warm, Dry.    Feet:  Normal by visual exam, Normal pulses, Sensation intact, By monofilament exam.    Neurologic:  No focal deficits.    Cognition and Speech:  Oriented, Speech clear and coherent.     Psychiatric:  Appropriate mood & affect, Normal judgment.       Health Maintenance      Recommendations     Pending (in the next year)        OverDue           Alcohol Misuse Screen (Male) due  05/02/15  and every 1  year(s)           DM - Foot Exam due  09/26/15  and every 1  year(s)           DM - HgbA1c due  08/12/17  and every 3  month(s)           Lipid Disorders Screen (Male) due  05/12/18  and every 1  year(s)           Depression Screen (Male) due  05/12/18  and every 1  year(s)        Due            Aspirin Therapy for Prevention of CVD (Male) due  07/11/18  and every 5  year(s)           Colorectal Cancer Screen (Colonoscopy) (Male) due  07/11/18  and every 10  year(s)           Colorectal Cancer Screen (Occult Blood) (Male) due  07/11/18  Variable frequency           Colorectal Cancer Screen (Sigmoidoscopy) (Male) due  07/11/18  Variable frequency           DM - Communication with Managing Provider due  07/11/18  and every 1  year(s)           DM - Eye Exam due  07/11/18  and every 1  year(s)           DM - Microalbumin due  07/11/18  and every 1  year(s)           Hepatitis C Screen 4072-6855 (Male) due  07/11/18  One-time only           Influenza Vaccine due  07/11/18  and every 1  year(s)           Lung Cancer Screen (Male) due  07/11/18  and every 1  year(s)     Satisfied (in the past 1 year)        Satisfied            Body Mass Index Check (Male) on  07/11/18.           High Blood Pressure Screen (Male) on  07/11/18.           Obesity Screen and Counseling (Male) on  07/11/18.           Tobacco Use Screen (Male) on  07/11/18.          Impression and Plan   Diagnosis     Annual exam (VYP20-DY Z00.00).     Central Sleep Apnea (KEJ62-JL G47.30).     Lipids abnormal (EYU36-PF E78.9).     HTN (hypertension) (FYT13-GY I10).     Type 2 diabetes mellitus (BZB35-UM E11.9).     Course:  Not progressing as expected.    Plan:  BMI,Weight loss,exercise,diet discussed  Diabetic ed for  teaching/glucometer  metformin  losartan  eye dr  prevnar  2-3 mo fu, fannie.    Patient Instructions:       Counseled: Patient, Regarding diagnosis, Regarding treatment, Regarding medications, Diet, Activity, Verbalized understanding.    Counseled:  Patient, Routine exercise and healthy weight maintenance discussed.    Patient Instructions:  Return to clinic in one year for next routine health visit, or sooner if problems or concerns.

## 2022-02-15 NOTE — PROGRESS NOTES
Patient:   ROSA MAY            MRN: [id number]            FIN: [id number]               Age:   61 years     Sex:  Male     :  1960   Associated Diagnoses:   Dyslipidemia; HTN (hypertension); Type 2 diabetes mellitus; Neuropathy, peripheral   Author:   Marcus Wolff MD      Visit Information      Date of Service: 2021 08:05 am  Performing Location: [facility]  Encounter#: [id number]      Primary Care Provider (PCP):  Marcus Wolff MD# [id number]      Referring Provider:  Marcus Wolff MD# [id number]      Chief Complaint   2021 8:11 AM CST   DM/HTN/HLD f/u and refills. COVID booster and flu due. Foot exam due. Needs new meter and supplies.        History of Present Illness   Patient is here for 6-month follow-up.  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  Wife is in good health.  He has son with diabetes with autism         Review of Systems   Constitutional:  Negative.    Eye:  Negative.    Ear/Nose/Mouth/Throat:  Negative.    Respiratory:  Negative.    Cardiovascular:  Negative.    Gastrointestinal:  Negative.    Genitourinary:  Negative.    Hematology/Lymphatics:  Negative.    Endocrine:  Negative.    Immunologic:  Negative.    Musculoskeletal:  Negative.    Integumentary:  Negative.    Neurologic:  Negative.       Health Status   Allergies:    Allergic Reactions (Selected)  Severity Not Documented  Dymatap (Skin ulcer)   Medications:  (Selected)   Prescriptions  Prescribed  aspirin 81 mg oral tablet, chewable: = 1 tab(s) ( 81 mg ), Oral, daily, # 90 tab(s), 0 Refill(s), Type: Maintenance, OTC (Rx)  atorvastatin 80 mg oral tablet: = 1 tab(s), Oral, daily, # 30 tab(s), 0 Refill(s), Type: Maintenance, Pharmacy: [facility], Labs and visit due for more refills,  1 tab(s) Oral daily, 76, in, 02/18/21 7:27:00 CST, Height Measured, 273, lb, 02/18/21 7:27:00 CST, Weig...  losartan 25 mg oral tablet: = 1 tab(s), Oral, daily, # 30 tab(s), 0 Refill(s), Type: Maintenance, Pharmacy: [facility], labs and visit due for more refills, 1 tab(s) Oral daily, 76, in, 02/18/21 7:27:00 CST, Height Measured, 273, lb, 02/18/21 7:27:00 CST, Weig...  metFORMIN 500 mg oral tablet: = 1 tab(s), Oral, bid, # 60 tab(s), 0 Refill(s), Type: Maintenance, Pharmacy: [facility], Labs and visit due for more refills, 1 tab(s) Oral bid, 76, in, 02/18/21 7:27:00 CST, Height Measured, 273, lb, 02/18/21 7:27:00 CST, Weight M...  one touch verio test strips: one touch verio test strips, See Instructions, Instructions: Test 3x/ day, Supply, # 300 EA, 3 Refill(s), Type: Maintenance, Pharmacy: [facility], Test 3x/ day  Documented Medications  Documented  cinnamon 500 mg oral capsule: = 1 cap(s) ( 500 mg ), Oral, daily, 0 Refill(s), Type: Maintenance,    Medications          *denotes recorded medication          one touch verio test strips: See Instructions, Test 3x/ day, 300 EA, 3 Refill(s).          aspirin 81 mg oral tablet, chewable: 81 mg, 1 tab(s), Oral, daily, 90 tab(s), 0 Refill(s).          atorvastatin 80 mg oral tablet: 1 tab(s), Oral, daily, 30 tab(s), 0 Refill(s).          *cinnamon 500 mg oral capsule: 500 mg, 1 cap(s), Oral, daily, 0 Refill(s).          losartan 25 mg oral tablet: 1 tab(s), Oral, daily, 30 tab(s), 0 Refill(s).          metFORMIN 500 mg oral tablet: 1 tab(s), Oral, bid, 60 tab(s), 0 Refill(s).       Problem list:    All Problems (Selected)  Obesity / SNOMED CT 9899848278 / Probable  Gout / SNOMED CT 709933618 / Confirmed  Dyslipidemia / SNOMED CT 5892073680 / Confirmed  Seasonal allergies / SNOMED CT 846820789 / Confirmed  Type 2 diabetes mellitus / SNOMED CT 018470996 / Confirmed  HTN (hypertension) / SNOMED CT 6953301974 /  Confirmed      Histories   Past Medical History:    Active  Seasonal allergies ([id number])   Family History:    Diabetes mellitus type I  Son (Daniel)  Stroke  Mother (Mariela)  Hypercholesterolemia  Mother (Mariela)  Father (Thierno)  Autism  Son (Daniel)  Bladder cancer..  Father (Thierno)     Procedure history:    Screening for colon cancer (SNOMED CT 2961871325) on 7/15/2019 at 59 Years.  Comments:  2/26/2020 9:50 AM CST - Sandra Nice negative   Social History:        Electronic Cigarette/Vaping Assessment            Electronic Cigarette Use: Never.      Alcohol Assessment            Current, Beer (12 oz), 1-2 times per year, 1 drinks/episode average.  Ready to change: No.      Tobacco Assessment            Never (less than 100 in lifetime)      Substance Abuse Assessment            Never      Employment and Education Assessment            Employed      Home and Environment Assessment            Marital status: .  Spouse/Partner name: Rhoda Kendall.  2 children.  Living situation:               Home/Independent.  Injuries/Abuse/Neglect in household: No.  Feels unsafe at home: No.  Family/Friends               available for support: Yes.      Nutrition and Health Assessment            Type of diet: Low carbohydrate.  Wants to lose weight: Yes.  Sleeping concerns: No.  Feels highly stressed:               Yes.      Exercise and Physical Activity Assessment: Occasional exercise      Sexual Assessment            Sexually active: Yes.  Identifies as male, Sexual orientation: Straight or heterosexual.  History of STD: No.               History of sexual abuse: No.        Physical Examination   Vital Signs   12/21/2021 8:11 AM CST Temperature Tympanic 97.9 DegF    Peripheral Pulse Rate 80 bpm    Pulse Site Radial artery    HR Method Electronic    Systolic Blood Pressure 149 mmHg  HI    Diastolic Blood Pressure 83 mmHg  HI    Mean Arterial Pressure 105 mmHg    BP Site Right arm    BP Method  Electronic      Measurements from flowsheet : Measurements   12/21/2021 8:11 AM CST   Weight Measured - Standard                273 lb     General:  Alert and oriented, No acute distress.    HENT:  Tympanic membranes are clear.    Neck:  Supple, Non-tender, No carotid bruit, No lymphadenopathy, No thyromegaly.    Respiratory:  Lungs are clear to auscultation, Respirations are non-labored.    Cardiovascular:  Normal rate, Regular rhythm, No murmur, Normal peripheral perfusion, No edema.    Gastrointestinal:  Soft, Non-tender, No organomegaly.    Integumentary:  No rash.    Feet:          Circulation: Bilateral, Within normal limits.         Sensory status: Bilateral, Diminished, By monofilament exam, distally.    Neurologic:  Alert, Oriented, No focal deficits, Cranial Nerves II-XII are grossly intact.       Health Maintenance      Recommendations     Pending (in the next year)        OverDue           DM - Foot Exam due  02/26/21  and every 1  year(s)           DM - HgbA1c due  05/18/21  and every 3  month(s)           Influenza Vaccine due  09/01/21  and every 1  year(s)           COVID-19 Vaccine (Moderna) Dose 3 Booster due  09/17/21  One-time only        Due            Colorectal Cancer Screen (Colonoscopy) due  12/21/21  Variable frequency           Colorectal Cancer Screen (Occult Blood) due  12/21/21  Variable frequency           Colorectal Cancer Screen (Sigmoidoscopy) due  12/21/21  Variable frequency           DM - Communication with Managing Provider due  12/21/21  and every 1  year(s)           Hepatitis C Screen [id number] due  12/21/21  One-time only           Lung Cancer Screen due  12/21/21  and every 1  year(s)        Near Due            DM - Eye Exam near due  02/18/22  and every 1  year(s)           Body Mass Index Check near due  02/18/22  and every 1  year(s)           Tobacco Use Screen near due  02/18/22  and every 1  year(s)           DM - Microalbumin near due  02/18/22  and every 1   year(s)           Lipid Disorders Screen near due  02/18/22  and every 1  year(s)           Type 2 Diabetes Mellitus Screen near due  02/18/22  and every 1  year(s)           Alcohol Misuse Screen near due  02/18/22  and every 1  year(s)           Depression Screen near due  02/18/22  and every 1  year(s)     Satisfied (in the past 1 year)        Satisfied            Alcohol Misuse Screen on  02/18/21.           Alcohol Misuse Screen on  02/18/21.           Body Mass Index Check on  02/18/21.           COVID-19 Vaccine (Moderna) Dose 2 on  03/17/21.           COVID-19 Vaccine (Moderna) Dose 1 on  02/17/21.           DM - Eye Exam on  02/18/21.           DM - HgbA1c on  02/18/21.           DM - Microalbumin on  02/18/21.           DM - Microalbumin on  02/18/21.           Depression Screen on  02/18/21.           Depression Screen on  02/18/21.           Depression Screen on  02/18/21.           High Blood Pressure Screen on  12/21/21.           High Blood Pressure Screen on  02/18/21.           Lipid Disorders Screen on  02/18/21.           Lipid Disorders Screen on  02/18/21.           Lipid Disorders Screen on  02/18/21.           Lipid Disorders Screen on  02/18/21.           Obesity Screen and Counseling on  12/21/21.           Obesity Screen and Counseling on  02/18/21.           Tobacco Use Screen on  02/18/21.           Type 2 Diabetes Mellitus Screen on  02/18/21.          Review / Management   Results review      Impression and Plan   Diagnosis     Dyslipidemia (MPW97-JG E78.5).     HTN (hypertension) (ABJ78-XU I10).     Type 2 diabetes mellitus (OSM49-SN E11.9).     Neuropathy, peripheral (DED84-XS G62.9).     Course:  Improving.    Plan:  1.  Diabetes mellitus A1c's have been below 7 we will recheck today encouraged him to check his sugars we will get him a new meter today.  Encouraged him to see his eye doctor.  Continue with Metformin and a baby aspirin   ???????No. 2 hypertension on losartan normal  pressures at home  3 diabetic peripheral neuropathy most likely will also check a B12  4.  Gout no flares for over a year  5.  Obesity needs to work on weight loss  6.  Hyperlipidemia on atorvastatin we will check labs  7.  Colon cancer screening due for Cologuard next year  .    Patient Instructions:       Counseled: Patient, Regarding diagnosis, Regarding treatment, Regarding medications, Diet, Activity.

## 2022-03-02 NOTE — LETTER
(Inserted Image. Unable to display)   February 22, 2022  ROSA MAY   [address] [place] [address]        Dear ROSA,    Thank you for selecting [facility] for your healthcare needs.    Our records indicate you are due for the following services:     Annual Physical     (FYI   Regarding office visits: In some instances, a video visit or telephone visit may be offered as an option.)    To schedule an appointment or if you have further questions, please contact your clinic at [phone].    Powered by [employer]    Sincerely,    Marcus Wolff MD

## 2022-09-04 PROBLEM — E66.9 OBESITY: Status: ACTIVE | Noted: 2022-09-04

## 2022-09-04 PROBLEM — M10.9 GOUT: Status: ACTIVE | Noted: 2022-09-04

## 2022-09-04 PROBLEM — I10 HYPERTENSION: Status: ACTIVE | Noted: 2022-09-04

## 2022-09-04 PROBLEM — E78.5 DYSLIPIDEMIA: Status: ACTIVE | Noted: 2022-09-04

## 2022-09-04 PROBLEM — J30.2 SEASONAL ALLERGIC RHINITIS: Status: ACTIVE | Noted: 2022-09-04

## 2022-09-04 PROBLEM — E11.9 TYPE 2 DIABETES MELLITUS (H): Status: ACTIVE | Noted: 2022-09-04

## 2022-09-04 PROBLEM — G62.9 PERIPHERAL NERVE DISEASE: Status: ACTIVE | Noted: 2022-09-04

## 2022-09-04 RX ORDER — ASPIRIN 81 MG/1
81 TABLET, CHEWABLE ORAL
COMMUNITY
Start: 2021-02-18 | End: 2023-03-15

## 2022-09-04 RX ORDER — LOSARTAN POTASSIUM 25 MG/1
25 TABLET ORAL DAILY
COMMUNITY
Start: 2021-12-21 | End: 2022-09-07

## 2022-09-04 RX ORDER — ATORVASTATIN CALCIUM 80 MG/1
80 TABLET, FILM COATED ORAL DAILY
COMMUNITY
Start: 2021-12-21 | End: 2022-09-07

## 2022-09-06 RX ORDER — AMPICILLIN TRIHYDRATE 250 MG
500 CAPSULE ORAL DAILY PRN
COMMUNITY
Start: 2021-12-21

## 2022-09-07 ENCOUNTER — OFFICE VISIT (OUTPATIENT)
Dept: FAMILY MEDICINE | Facility: CLINIC | Age: 62
End: 2022-09-07
Payer: COMMERCIAL

## 2022-09-07 VITALS
TEMPERATURE: 98.9 F | DIASTOLIC BLOOD PRESSURE: 84 MMHG | HEART RATE: 76 BPM | HEIGHT: 76 IN | WEIGHT: 266 LBS | SYSTOLIC BLOOD PRESSURE: 128 MMHG | BODY MASS INDEX: 32.39 KG/M2

## 2022-09-07 DIAGNOSIS — I10 PRIMARY HYPERTENSION: ICD-10-CM

## 2022-09-07 DIAGNOSIS — Z12.11 COLON CANCER SCREENING: ICD-10-CM

## 2022-09-07 DIAGNOSIS — Z23 NEED FOR VACCINATION: ICD-10-CM

## 2022-09-07 DIAGNOSIS — E11.42 TYPE 2 DIABETES MELLITUS WITH DIABETIC POLYNEUROPATHY, UNSPECIFIED WHETHER LONG TERM INSULIN USE (H): Primary | ICD-10-CM

## 2022-09-07 DIAGNOSIS — E78.5 DYSLIPIDEMIA: ICD-10-CM

## 2022-09-07 LAB — HBA1C MFR BLD: 6.7 % (ref 0–5.6)

## 2022-09-07 PROCEDURE — 36415 COLL VENOUS BLD VENIPUNCTURE: CPT | Performed by: FAMILY MEDICINE

## 2022-09-07 PROCEDURE — 99214 OFFICE O/P EST MOD 30 MIN: CPT | Mod: 25 | Performed by: FAMILY MEDICINE

## 2022-09-07 PROCEDURE — 83036 HEMOGLOBIN GLYCOSYLATED A1C: CPT | Performed by: FAMILY MEDICINE

## 2022-09-07 PROCEDURE — 90682 RIV4 VACC RECOMBINANT DNA IM: CPT | Performed by: FAMILY MEDICINE

## 2022-09-07 PROCEDURE — 90471 IMMUNIZATION ADMIN: CPT | Performed by: FAMILY MEDICINE

## 2022-09-07 RX ORDER — LOSARTAN POTASSIUM 25 MG/1
25 TABLET ORAL DAILY
Qty: 90 TABLET | Refills: 1 | Status: SHIPPED | OUTPATIENT
Start: 2022-09-07 | End: 2023-03-15

## 2022-09-07 RX ORDER — ATORVASTATIN CALCIUM 80 MG/1
80 TABLET, FILM COATED ORAL DAILY
Qty: 90 TABLET | Refills: 1 | Status: SHIPPED | OUTPATIENT
Start: 2022-09-07 | End: 2023-03-15

## 2022-09-07 ASSESSMENT — PATIENT HEALTH QUESTIONNAIRE - PHQ9
10. IF YOU CHECKED OFF ANY PROBLEMS, HOW DIFFICULT HAVE THESE PROBLEMS MADE IT FOR YOU TO DO YOUR WORK, TAKE CARE OF THINGS AT HOME, OR GET ALONG WITH OTHER PEOPLE: NOT DIFFICULT AT ALL
SUM OF ALL RESPONSES TO PHQ QUESTIONS 1-9: 0
SUM OF ALL RESPONSES TO PHQ QUESTIONS 1-9: 0

## 2022-09-07 NOTE — LETTER
October 23, 2022      Thierno Kendall   [address] [place] [id number]        Dear ,    We are writing to inform you of your test results.    Your test results fall within the expected range(s) or remain unchanged from previous results.  Please continue with current treatment plan.    Resulted Orders   COLOGUARD(EXACT SCIENCES)   Result Value Ref Range    COLOGUARD-ABSTRACT Negative Negative      Comment:        NEGATIVE TEST RESULT. A negative Cologuard result indicates a low likelihood that a colorectal cancer (CRC) or advanced adenoma (adenomatous polyps with more advanced pre-malignant features)  is present. The chance that a person with a negative Cologuard test has a colorectal cancer is less than 1 in 1500 (negative predictive value >99.9%) or has an  advanced adenoma is less than  5.3% (negative predictive value 94.7%). These data are based on a prospective cross-sectional study of 10,000 individuals at average risk for colorectal cancer who were screened with both Cologuard and colonoscopy. (Gil DUMONT. et al, N Engl J Med 2014;370(14):3483-0681) The normal value (reference range) for this assay is negative.    COLOGUARD RE-SCREENING RECOMMENDATION: Periodic colorectal cancer screening is an important part of preventive healthcare for asymptomatic individuals at average risk for colorectal cancer.  Following a negative Cologuard result, the American Cancer Society and U.S.  Multi-Society Task Force screening guidelines recommend a Cologuard re-screening interval of 3 years.   References: American Cancer Society Guideline for Colorectal Cancer Screening: https://www.cancer.org/cancer/colon-rectal-cancer/yxyvcfqfq-tupphfnvh-ocsouxq/acs-recommendations.html.; Rom MARINELLI, Luis DURHAM, Jose CARBAJAL, Colorectal Cancer Screening: Recommendations for Physicians and Patients from the U.S. Multi-Society Task Force on Colorectal Cancer Screening , Am J Gastroenterology 2017; 112:0913-0659.    TEST  DESCRIPTION: Composite algorithmic analysis of stool DNA-biomarkers with hemoglobin immunoassay.   Quantitative values of individual biomarkers are not reportable and are not associated with individual biomarker result reference ranges. Cologuard is intended for colorectal cancer screening of adults of either sex, 45 years or older, who are at average-risk for colorectal cancer (CRC). Cologuard has been approved for use by the U.S. FDA. The performance of Cologuard was  established in a cross sectional study of average-risk adults aged 50-84. Cologuard performance in patients ages 45 to 49 years was estimated by sub-group analysis of near-age groups. Colonoscopies performed for a positive result may find as the most clinically significant lesion: colorectal cancer [4.0%], advanced adenoma (including sessile serrated polyps greater than or equal to 1cm diameter) [20%] or non- advanced adenoma [31%]; or no colorectal neoplasia [45%]. These estimates are derived from a prospective cross-sectional screening study of 10,000 individuals at average risk for colorectal cancer who were screened with both Cologuard and colonoscopy. (Gil MORRISON et al, N Engl J Med 2014;370(14):3091-2904.) Cologuard may produce a false negative or false positive result (no colorectal cancer or precancerous polyp present at colonoscopy follow up). A negative Cologuard test result does not guarantee the absence of CRC or advanced adenoma (pre-cancer). The current Cologuard  screening interval is every 3 years. (American Cancer Society and U.S. Multi-Society Task Force). Cologuard performance data in a 10,000 patient pivotal study using colonoscopy as the reference method can be accessed at the following location: www.web care [employer]/results. Additional description of the Cologuard test process, warnings and precautions can be found at www.[employer].com.     Hemoglobin A1c   Result Value Ref Range    Hemoglobin A1C 6.7 (H) 0.0 - 5.6 %       Comment:      Normal <5.7%   Prediabetes 5.7-6.4%    Diabetes 6.5% or higher     Note: Adopted from ADA consensus guidelines.       If you have any questions or concerns, please call the clinic at the number listed above.       Sincerely,      Marcus Wolff MD

## 2022-09-07 NOTE — LETTER
September 8, 2022      Thierno Kendall   [address] [place] [id number]        Dear ,    We are writing to inform you of your test results.    Your test results fall within the expected range(s) or remain unchanged from previous results.  Please continue with current treatment plan.    Resulted Orders   Hemoglobin A1c   Result Value Ref Range    Hemoglobin A1C 6.7 (H) 0.0 - 5.6 %      Comment:      Normal <5.7%   Prediabetes 5.7-6.4%    Diabetes 6.5% or higher     Note: Adopted from ADA consensus guidelines.       If you have any questions or concerns, please call the clinic at the number listed above.       Sincerely,      Marcus Wolff MD

## 2022-09-07 NOTE — PROGRESS NOTES
"  Assessment & Plan     Type 2 diabetes mellitus with diabetic polyneuropathy, unspecified whether long term insulin use (H)  Under good control A1c's have been consistently below 7 he is up-to-date on eye checks  - Hemoglobin A1c; Future  - atorvastatin (LIPITOR) 80 MG tablet; Take 1 tablet (80 mg) by mouth daily  - losartan (COZAAR) 25 MG tablet; Take 1 tablet (25 mg) by mouth daily  - metFORMIN (GLUCOPHAGE) 500 MG tablet; Take 1 tablet (500 mg) by mouth 2 times daily (with meals)    Primary hypertension  Under good control    Dyslipidemia  Controlled    Need for vaccination    - INFLUENZA QUAD, PF (RIV4) (FLUBLOK)    Colon cancer screening    - COLOGUARD(EXACT SCIENCES)             BMI:   Estimated body mass index is 32.38 kg/m  as calculated from the following:    Height as of this encounter: 1.93 m (6' 4\").    Weight as of this encounter: 120.7 kg (266 lb).   Weight management plan: Discussed healthy diet and exercise guidelines        Return in about 6 months (around 3/7/2023) for Follow up.    Marcus Wolff MD  [facility]    Niki Pa is a 62 year old, presenting for the following health issues: Overall patient doing well.  He is wife was recently diagnosed with diabetes.  His adult son has type 1 diabetes and autism.  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  No chief complaint on file.      History of Present Illness       Diabetes:   He presents for follow up of diabetes.  He is checking home blood glucose two times daily. He checks blood glucose before meals.  Blood glucose is never over 200 and never under 70. When his blood glucose is low, the patient is asymptomatic for confusion, blurred vision, lethargy and reports not feeling dizzy, shaky, or weak.  He has no concerns regarding his diabetes at this time.  He is having numbness in feet. The patient has had a diabetic eye exam in the last 12 months.         He eats 2-3 servings of fruits and " "vegetables daily.He consumes 0 sweetened beverage(s) daily.He exercises with enough effort to increase his heart rate 9 or less minutes per day.  He exercises with enough effort to increase his heart rate 3 or less days per week.   He is taking medications regularly.    Today's PHQ-9         PHQ-9 Total Score: 0    PHQ-9 Q9 Thoughts of better off dead/self-harm past 2 weeks :   Not at all    How difficult have these problems made it for you to do your work, take care of things at home, or get along with other people: Not difficult at all       Patient Active Problem List   Diagnosis     Dyslipidemia     Gout     Hypertension     Obesity     Peripheral nerve disease     Seasonal allergic rhinitis     Type 2 diabetes mellitus (H)     Current Outpatient Medications   Medication     atorvastatin (LIPITOR) 80 MG tablet     blood glucose (NO BRAND SPECIFIED) lancets standard     blood glucose (NO BRAND SPECIFIED) test strip     blood glucose monitoring (NO BRAND SPECIFIED) meter device kit     cinnamon 500 MG CAPS     losartan (COZAAR) 25 MG tablet     metFORMIN (GLUCOPHAGE) 500 MG tablet     aspirin (ASA) 81 MG chewable tablet     No current facility-administered medications for this visit.           Review of Systems   Constitutional, HEENT, cardiovascular, pulmonary, gi and gu systems are negative, except as otherwise noted.      Objective    /84 (BP Location: Right arm, Patient Position: Sitting, Cuff Size: Adult Large)   Pulse 76   Temp 98.9  F (37.2  C) (Temporal)   Ht 1.93 m (6' 4\")   Wt 120.7 kg (266 lb)   BMI 32.38 kg/m    Body mass index is 32.38 kg/m .  Physical Exam   GENERAL: healthy, alert and no distress  NECK: no adenopathy, no asymmetry, masses, or scars and thyroid normal to palpation  RESP: lungs clear to auscultation - no rales, rhonchi or wheezes  CV: regular rate and rhythm, normal S1 S2, no S3 or S4, no murmur, click or rub, no peripheral edema and peripheral pulses strong  ABDOMEN: soft, " nontender, no hepatosplenomegaly, no masses and bowel sounds normal  MS: no gross musculoskeletal defects noted, no edema  Diabetic foot exam: normal DP and PT pulses, no trophic changes or ulcerative lesions and normal sensory exam                    [unfilled]  [unfilled]

## 2022-10-22 LAB — NONINV COLON CA DNA+OCC BLD SCRN STL QL: NEGATIVE

## 2023-01-12 ENCOUNTER — TELEPHONE (OUTPATIENT)
Dept: FAMILY MEDICINE | Facility: CLINIC | Age: 63
End: 2023-01-12

## 2023-01-12 DIAGNOSIS — R05.9 COUGH: ICD-10-CM

## 2023-01-12 DIAGNOSIS — U07.1 INFECTION DUE TO 2019 NOVEL CORONAVIRUS: ICD-10-CM

## 2023-01-12 DIAGNOSIS — R50.9 FEVER, UNSPECIFIED FEVER CAUSE: ICD-10-CM

## 2023-01-12 NOTE — TELEPHONE ENCOUNTER
COVID Positive/Requesting COVID treatment    Patient is positive for COVID and requesting treatment options.    Date of positive COVID test (PCR or at home)? 1/11;1/12  Current COVID symptoms: did not get list of sx's  Date COVID symptoms began: 1/10/2023    Message should be routed to clinic RN pool. Best phone number to use for call back: [phone]

## 2023-01-12 NOTE — TELEPHONE ENCOUNTER
RN COVID TREATMENT VISIT  01/12/23    Thierno Kendall  62 year old  Current weight? 265lbs    Has the patient been seen by a primary care provider at an [facility] or [facility] within the past two years? Yes.   Have you been in close proximity to/do you have a known exposure to a person with a confirmed case of influenza? No.     Date of positive COVID test (PCR or at home)? 1/11/23    Current COVID symptoms: fever or chills, cough, fatigue, headache, sore throat and congestion or runny nose    Date COVID symptoms began: 1/10/23    Do you have any of the following conditions that place you at risk of being very sick from COVID-19? diabetes and overweight (BMI>25)    Is patient eligible to continue? Yes, established patient, 12 years or older weighing at least 88.2 lbs, who has COVID symptoms that started in the past 5 days and is at risk for being very sick from COVID-19.       Have you received monoclonal antibodies or oral antiviral medications since testing positive to COVID-19? No    Are you currently hospitalized for COVID-19? No    Do you have a history of hepatitis? No    Are you currently pregnant or nursing? No    Do you have a clinically significant hypersensitivity to nirmatrelvir, ritonavir, or molnupiravir? No    Do you have any history of severe renal impairment (eGFR < 30mL/min)? No    Do you have any history of hepatic impairment or abnormalities (e.g. hepatic panel, ALT, AST, ALK Phos, bilirubin)? No    Have you had a coronary stent placed in the previous 6 months? No    Is patient eligible to continue?   Yes, patient meets all eligibility requirements for the RN COVID treatment (as denoted by all no responses above).     Current Outpatient Medications   Medication Sig Dispense Refill     aspirin (ASA) 81 MG chewable tablet Take 81 mg by mouth (Patient not taking: Reported on 9/7/2022)       atorvastatin (LIPITOR) 80 MG tablet Take 1 tablet (80 mg) by mouth daily 90  tablet 1     blood glucose (NO BRAND SPECIFIED) lancets standard Test 3 times daily       blood glucose (NO BRAND SPECIFIED) test strip Test 3 times daily       blood glucose monitoring (NO BRAND SPECIFIED) meter device kit Test 3 times daily       cinnamon 500 MG CAPS Take 500 mg by mouth daily as needed       losartan (COZAAR) 25 MG tablet Take 1 tablet (25 mg) by mouth daily 90 tablet 1     metFORMIN (GLUCOPHAGE) 500 MG tablet Take 1 tablet (500 mg) by mouth 2 times daily (with meals) 180 tablet 1       Medications from List 1 of the standing order (on medications that exclude the use of Paxlovid) that patient is taking: NONE. Is patient taking Tad's Wort? No  Is patient taking Tad's Wort or any meds from List 1? No.   Medications from List 2 of the standing order (on meds that provider needs to adjust) that patient is taking: NONE. Is patient on any of the meds from List 2? No.   Medications from List 3 of standing order (on meds that a RN needs to adjust) that patient is taking: atorvastatin (Lipitor): Instructed patient to stop atorvastatin while taking Paxlovid and restart atorvastatin 1 day after the completion of Paxlovid.  Is patient on any meds from List 3? Yes. Patient is on meds from list 3. No meds require a provider visit and at least one med required RN to adjust.   In order of efficacy, Paxlovid has an approximate 90% reduction in hospitalization. Paxlovid can possibly cause altered sense of taste, diarrhea (loose, watery stools), high blood pressure, muscle aches.  The other option is molnupiravir which has an approximate 30% reduction in hospitalization. Molnupirarivr can possibly cause diarrhea (loose, watery stools), nausea (feeling sick to your stomach), dizziness, headaches.    Which treatment option does the patient prefer?   Paxlovid.   Lab Results   Component Value Date    GFRESTIMATED 71 12/21/2021       Was last eGFR reduced? No, eGFR 60 or greater/ No Result on record. Patient  can receive the normal renal function dose. Paxlovid Rx sent to [place] pharmacy   [facility]    Temporary change to home medications: hold atorvastatin    All medication adjustments (holds, etc) were discussed with the patient and patient was asked to repeat back (teachback) their med adjustment.  Did patient understand med adjustment? Yes, patient repeated back and understood correctly.        Reviewed the following instructions with the patient:    Paxlovid (nimatrelvir and ritonavir)    How it works  Two medicines (nirmatrelvir and ritonavir) are taken together. They stop the virus from growing. Less amount of virus is easier for your body to fight.    How to take    Medicine comes in a daily container with both medicine tablets. Take by mouth twice daily (once in the morning, once at night) for 5 days.    The number of tablets to take varies by patient.    Don't chew or break capsules. Swallow whole.    When to take  Take as soon as possible after positive COVID-19 test result, and within 5 days of your first symptoms.    Possible side effects  Can cause altered sense of taste, diarrhea (loose, watery stools), high blood pressure, muscle aches.    Advised to seek medical attention if breathing difficulty. Advised to call with questions or follow up if sxs are not improving.     Lauren Sorensen RN

## 2023-03-15 ENCOUNTER — OFFICE VISIT (OUTPATIENT)
Dept: FAMILY MEDICINE | Facility: CLINIC | Age: 63
End: 2023-03-15
Payer: COMMERCIAL

## 2023-03-15 VITALS
BODY MASS INDEX: 33.36 KG/M2 | DIASTOLIC BLOOD PRESSURE: 84 MMHG | WEIGHT: 274 LBS | TEMPERATURE: 97.5 F | SYSTOLIC BLOOD PRESSURE: 134 MMHG | HEIGHT: 76 IN | HEART RATE: 72 BPM

## 2023-03-15 DIAGNOSIS — I10 PRIMARY HYPERTENSION: ICD-10-CM

## 2023-03-15 DIAGNOSIS — E11.42 TYPE 2 DIABETES MELLITUS WITH DIABETIC POLYNEUROPATHY, UNSPECIFIED WHETHER LONG TERM INSULIN USE (H): Primary | ICD-10-CM

## 2023-03-15 DIAGNOSIS — M10.9 GOUT, UNSPECIFIED CAUSE, UNSPECIFIED CHRONICITY, UNSPECIFIED SITE: ICD-10-CM

## 2023-03-15 DIAGNOSIS — E78.5 DYSLIPIDEMIA: ICD-10-CM

## 2023-03-15 LAB
ANION GAP SERPL CALCULATED.3IONS-SCNC: 13 MMOL/L (ref 7–15)
BUN SERPL-MCNC: 14.9 MG/DL (ref 8–23)
CALCIUM SERPL-MCNC: 9.4 MG/DL (ref 8.8–10.2)
CHLORIDE SERPL-SCNC: 101 MMOL/L (ref 98–107)
CHOLEST SERPL-MCNC: 219 MG/DL
CREAT SERPL-MCNC: 1.14 MG/DL (ref 0.67–1.17)
CREAT UR-MCNC: 168 MG/DL
DEPRECATED HCO3 PLAS-SCNC: 25 MMOL/L (ref 22–29)
GFR SERPL CREATININE-BSD FRML MDRD: 73 ML/MIN/1.73M2
GLUCOSE SERPL-MCNC: 156 MG/DL (ref 70–99)
HBA1C MFR BLD: 7.7 % (ref 0–5.6)
HDLC SERPL-MCNC: 44 MG/DL
LDLC SERPL CALC-MCNC: 117 MG/DL
MICROALBUMIN UR-MCNC: <12 MG/L
MICROALBUMIN/CREAT UR: NORMAL MG/G{CREAT}
NONHDLC SERPL-MCNC: 175 MG/DL
POTASSIUM SERPL-SCNC: 4 MMOL/L (ref 3.4–5.3)
SODIUM SERPL-SCNC: 139 MMOL/L (ref 136–145)
TRIGL SERPL-MCNC: 290 MG/DL

## 2023-03-15 PROCEDURE — 82043 UR ALBUMIN QUANTITATIVE: CPT | Performed by: FAMILY MEDICINE

## 2023-03-15 PROCEDURE — 83036 HEMOGLOBIN GLYCOSYLATED A1C: CPT | Performed by: FAMILY MEDICINE

## 2023-03-15 PROCEDURE — 36415 COLL VENOUS BLD VENIPUNCTURE: CPT | Performed by: FAMILY MEDICINE

## 2023-03-15 PROCEDURE — 82570 ASSAY OF URINE CREATININE: CPT | Performed by: FAMILY MEDICINE

## 2023-03-15 PROCEDURE — 80061 LIPID PANEL: CPT | Performed by: FAMILY MEDICINE

## 2023-03-15 PROCEDURE — 99214 OFFICE O/P EST MOD 30 MIN: CPT | Performed by: FAMILY MEDICINE

## 2023-03-15 PROCEDURE — 80048 BASIC METABOLIC PNL TOTAL CA: CPT | Performed by: FAMILY MEDICINE

## 2023-03-15 RX ORDER — LOSARTAN POTASSIUM 25 MG/1
25 TABLET ORAL DAILY
Qty: 90 TABLET | Refills: 1 | Status: SHIPPED | OUTPATIENT
Start: 2023-03-15 | End: 2023-09-19

## 2023-03-15 RX ORDER — ATORVASTATIN CALCIUM 80 MG/1
80 TABLET, FILM COATED ORAL DAILY
Qty: 90 TABLET | Refills: 1 | Status: SHIPPED | OUTPATIENT
Start: 2023-03-15 | End: 2023-09-12

## 2023-03-15 NOTE — LETTER
March 15, 2023      Thierno Kendall   [address] [place] [id number]        Dear ,    We are writing to inform you of your test results.    Test results indicate you may require additional follow up, see comment below.  Your A1c is up considerably.  Please set up a visit with Waleska English here at the clinic to discuss further.    Resulted Orders   Hemoglobin A1c   Result Value Ref Range    Hemoglobin A1C 7.7 (H) 0.0 - 5.6 %      Comment:      Normal <5.7%   Prediabetes 5.7-6.4%    Diabetes 6.5% or higher     Note: Adopted from ADA consensus guidelines.   Basic metabolic panel   Result Value Ref Range    Sodium 139 136 - 145 mmol/L    Potassium 4.0 3.4 - 5.3 mmol/L    Chloride 101 98 - 107 mmol/L    Carbon Dioxide (CO2) 25 22 - 29 mmol/L    Anion Gap 13 7 - 15 mmol/L    Urea Nitrogen 14.9 8.0 - 23.0 mg/dL    Creatinine 1.14 0.67 - 1.17 mg/dL    Calcium 9.4 8.8 - 10.2 mg/dL    Glucose 156 (H) 70 - 99 mg/dL    GFR Estimate 73 >60 mL/min/1.73m2      Comment:      eGFR calculated using 2021 CKD-EPI equation.   Lipid panel reflex to direct LDL Fasting   Result Value Ref Range    Cholesterol 219 (H) <200 mg/dL    Triglycerides 290 (H) <150 mg/dL    Direct Measure HDL 44 >=40 mg/dL    LDL Cholesterol Calculated 117 (H) <=100 mg/dL    Non HDL Cholesterol 175 (H) <130 mg/dL    Narrative    Cholesterol  Desirable:  <200 mg/dL    Triglycerides  Normal:  Less than 150 mg/dL  Borderline High:  150-199 mg/dL  High:  200-499 mg/dL  Very High:  Greater than or equal to 500 mg/dL    Direct Measure HDL  Female:  Greater than or equal to 50 mg/dL   Male:  Greater than or equal to 40 mg/dL    LDL Cholesterol  Desirable:  <100mg/dL  Above Desirable:  100-129 mg/dL   Borderline High:  130-159 mg/dL   High:  160-189 mg/dL   Very High:  >= 190 mg/dL    Non HDL Cholesterol  Desirable:  130 mg/dL  Above Desirable:  130-159 mg/dL  Borderline High:  160-189 mg/dL  High:  190-219 mg/dL  Very High:  Greater than or equal  to 220 mg/dL       If you have any questions or concerns, please call the clinic at the number listed above.       Sincerely,      Marcus Wolff MD

## 2023-03-15 NOTE — PROGRESS NOTES
"  Assessment & Plan     Type 2 diabetes mellitus with diabetic polyneuropathy, unspecified whether long term insulin use (H)  Overall doing well.  We will check labs today.  See him back in 6 months he is up-to-date on his eye checks and encouragement see his eye doctor  - Hemoglobin A1c  - Albumin Random Urine Quantitative with Creat Ratio  - Basic metabolic panel  - Lipid panel reflex to direct LDL Fasting  - atorvastatin (LIPITOR) 80 MG tablet; Take 1 tablet (80 mg) by mouth daily  - losartan (COZAAR) 25 MG tablet; Take 1 tablet (25 mg) by mouth daily  - metFORMIN (GLUCOPHAGE) 500 MG tablet; Take 1 tablet (500 mg) by mouth 2 times daily (with meals)    Primary hypertension  Under reasonable control with losartan    Dyslipidemia  Under good control with atorvastatin    Gout, unspecified cause, unspecified chronicity, unspecified site  No flareups    Colon cancer screening Cologuard in 2022 due in 2025           BMI:   Estimated body mass index is 33.35 kg/m  as calculated from the following:    Height as of this encounter: 1.93 m (6' 4\").    Weight as of this encounter: 124.3 kg (274 lb).           Return in about 6 months (around 9/15/2023) for Follow up.    Marcus Wolff MD  [facility]    Niki Pa is a 62 year old, presenting for the following health issues: Overall doing well.  No issues with his blood sugars.  His wife has type 2 diabetes his son has type 1 diabetes also he has autism.  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  No falls this winter.  Neuropathy has been stable.  He plans on retiring in 5 years  Diabetes and Hypertension      History of Present Illness       Diabetes:   He presents for follow up of diabetes.  He is checking home blood glucose a few times a month. He checks blood glucose before meals and at bedtime.  Blood glucose is never over 200 and never under 70. When his blood glucose is low, the patient is asymptomatic for confusion, " "blurred vision, lethargy and reports not feeling dizzy, shaky, or weak.  He has no concerns regarding his diabetes at this time.  He is having numbness in feet. The patient has not had a diabetic eye exam in the last 12 months.         Hyperlipidemia:  He presents for follow up of hyperlipidemia.  He is taking medication to lower cholesterol. He is not having myalgia or other side effects to statin medications.    He eats 2-3 servings of fruits and vegetables daily.He consumes 2 sweetened beverage(s) daily.He exercises with enough effort to increase his heart rate 9 or less minutes per day.  He exercises with enough effort to increase his heart rate 3 or less days per week.   He is taking medications regularly.         Patient Active Problem List   Diagnosis     Dyslipidemia     Gout     Hypertension     Obesity     Peripheral nerve disease     Seasonal allergic rhinitis     Type 2 diabetes mellitus (H)     Current Outpatient Medications   Medication     atorvastatin (LIPITOR) 80 MG tablet     blood glucose (NO BRAND SPECIFIED) lancets standard     blood glucose (NO BRAND SPECIFIED) test strip     blood glucose monitoring (NO BRAND SPECIFIED) meter device kit     cinnamon 500 MG CAPS     losartan (COZAAR) 25 MG tablet     metFORMIN (GLUCOPHAGE) 500 MG tablet     aspirin (ASA) 81 MG chewable tablet     No current facility-administered medications for this visit.         Review of Systems   Constitutional, HEENT, cardiovascular, pulmonary, gi and gu systems are negative, except as otherwise noted.      Objective    /84 (BP Location: Right arm, Patient Position: Sitting, Cuff Size: Adult Large)   Pulse 72   Temp 97.5  F (36.4  C) (Temporal)   Ht 1.93 m (6' 4\")   Wt 124.3 kg (274 lb)   BMI 33.35 kg/m    Body mass index is 33.35 kg/m .  Physical Exam   GENERAL: healthy, alert and no distress  NECK: no adenopathy, no asymmetry, masses, or scars and thyroid normal to palpation  RESP: lungs clear to auscultation " - no rales, rhonchi or wheezes  CV: regular rate and rhythm, normal S1 S2, no S3 or S4, no murmur, click or rub, no peripheral edema and peripheral pulses strong  ABDOMEN: soft, nontender, no hepatosplenomegaly, no masses and bowel sounds normal  MS: no gross musculoskeletal defects noted, no edema  Diabetic foot exam: normal DP and PT pulses    Office Visit on 09/07/2022   Component Date Value Ref Range Status     COLOGUARD-ABSTRACT 10/15/2022 Negative  Negative Final    Comment:   NEGATIVE TEST RESULT. A negative Cologuard result indicates a low likelihood that a colorectal cancer (CRC) or advanced adenoma (adenomatous polyps with more advanced pre-malignant features)  is present. The chance that a person with a negative Cologuard test has a colorectal cancer is less than 1 in 1500 (negative predictive value >99.9%) or has an  advanced adenoma is less than  5.3% (negative predictive value 94.7%). These data are based on a prospective cross-sectional study of 10,000 individuals at average risk for colorectal cancer who were screened with both Cologuard and colonoscopy. (Gil MORRISON et al, N Engl J Med 2014;370(14):3202-8709) The normal value (reference range) for this assay is negative.    COLOGUARD RE-SCREENING RECOMMENDATION: Periodic colorectal cancer screening is an important part of preventive healthcare for asymptomatic individuals at average risk for colorectal cancer.  Following a negative Cologuard result, the American Cancer Society and U.S.                            Multi-Society Task Force screening guidelines recommend a Cologuard re-screening interval of 3 years.   References: American Cancer Society Guideline for Colorectal Cancer Screening: https://www.cancer.org/cancer/colon-rectal-cancer/hysgyojax-bugtiezst-gchdola/acs-recommendations.html.; Rom MARINELLI, Luis DURHAM, Jose MILTONK, Colorectal Cancer Screening: Recommendations for Physicians and Patients from the U.S. Multi-Society Task Force on  Colorectal Cancer Screening , Am J Gastroenterology 2017; 112:6633-3337.    TEST DESCRIPTION: Composite algorithmic analysis of stool DNA-biomarkers with hemoglobin immunoassay.   Quantitative values of individual biomarkers are not reportable and are not associated with individual biomarker result reference ranges. Cologuard is intended for colorectal cancer screening of adults of either sex, 45 years or older, who are at average-risk for colorectal cancer (CRC). Cologuard has been approved for use by the U.S. FDA. The performance of Cologuard was                            established in a cross sectional study of average-risk adults aged 50-84. Cologuard performance in patients ages 45 to 49 years was estimated by sub-group analysis of near-age groups. Colonoscopies performed for a positive result may find as the most clinically significant lesion: colorectal cancer [4.0%], advanced adenoma (including sessile serrated polyps greater than or equal to 1cm diameter) [20%] or non- advanced adenoma [31%]; or no colorectal neoplasia [45%]. These estimates are derived from a prospective cross-sectional screening study of 10,000 individuals at average risk for colorectal cancer who were screened with both Cologuard and colonoscopy. (Gil Car al, N Engl J Med 2014;370(14):8756-4663.) Cologuard may produce a false negative or false positive result (no colorectal cancer or precancerous polyp present at colonoscopy follow up). A negative Cologuard test result does not guarantee the absence of CRC or advanced adenoma (pre-cancer). The current Cologuard                            screening interval is every 3 years. (American Cancer Society and U.S. Multi-Society Task Force). Cologuard performance data in a 10,000 patient pivotal study using colonoscopy as the reference method can be accessed at the following location: [email or web address].com/results. Additional description of the Cologuard test process, warnings and  precautions can be found at www.[email or web address]og[employer]rd.com.       Hemoglobin A1C 09/07/2022 6.7 (H)  0.0 - 5.6 % Final    Normal <5.7%   Prediabetes 5.7-6.4%    Diabetes 6.5% or higher     Note: Adopted from ADA consensus guidelines.

## 2023-09-11 DIAGNOSIS — E11.42 TYPE 2 DIABETES MELLITUS WITH DIABETIC POLYNEUROPATHY, UNSPECIFIED WHETHER LONG TERM INSULIN USE (H): ICD-10-CM

## 2023-09-12 RX ORDER — ATORVASTATIN CALCIUM 80 MG/1
80 TABLET, FILM COATED ORAL DAILY
Qty: 90 TABLET | Refills: 1 | Status: SHIPPED | OUTPATIENT
Start: 2023-09-12 | End: 2024-03-12

## 2023-09-19 DIAGNOSIS — E11.42 TYPE 2 DIABETES MELLITUS WITH DIABETIC POLYNEUROPATHY, UNSPECIFIED WHETHER LONG TERM INSULIN USE (H): ICD-10-CM

## 2023-09-19 RX ORDER — LOSARTAN POTASSIUM 25 MG/1
25 TABLET ORAL DAILY
Qty: 90 TABLET | Refills: 3 | Status: SHIPPED | OUTPATIENT
Start: 2023-09-19 | End: 2024-04-12

## 2023-09-19 NOTE — TELEPHONE ENCOUNTER
Routing refill request to provider for review/approval because:  Labs not current:    Hemoglobin A1C   Date Value Ref Range Status   03/15/2023 7.7 (H) 0.0 - 5.6 % Final     Comment:     Normal <5.7%   Prediabetes 5.7-6.4%    Diabetes 6.5% or higher     Note: Adopted from ADA consensus guidelines.     Patient needs to be seen because it has been more than 6 months since last office visit.    Also routing to clinic pool. Please reach out and schedule patient.    Araceli Miller RN  [facility]

## 2023-11-03 ENCOUNTER — OFFICE VISIT (OUTPATIENT)
Dept: FAMILY MEDICINE | Facility: CLINIC | Age: 63
End: 2023-11-03
Payer: COMMERCIAL

## 2023-11-03 VITALS
HEART RATE: 78 BPM | TEMPERATURE: 97.8 F | BODY MASS INDEX: 33.85 KG/M2 | SYSTOLIC BLOOD PRESSURE: 136 MMHG | WEIGHT: 278 LBS | RESPIRATION RATE: 20 BRPM | HEIGHT: 76 IN | OXYGEN SATURATION: 96 % | DIASTOLIC BLOOD PRESSURE: 78 MMHG

## 2023-11-03 DIAGNOSIS — G47.33 OSA (OBSTRUCTIVE SLEEP APNEA): ICD-10-CM

## 2023-11-03 DIAGNOSIS — E78.5 DYSLIPIDEMIA: ICD-10-CM

## 2023-11-03 DIAGNOSIS — E11.42 TYPE 2 DIABETES MELLITUS WITH DIABETIC POLYNEUROPATHY, UNSPECIFIED WHETHER LONG TERM INSULIN USE (H): Primary | ICD-10-CM

## 2023-11-03 DIAGNOSIS — M10.9 GOUT, UNSPECIFIED CAUSE, UNSPECIFIED CHRONICITY, UNSPECIFIED SITE: ICD-10-CM

## 2023-11-03 DIAGNOSIS — L98.9 SKIN LESION: ICD-10-CM

## 2023-11-03 DIAGNOSIS — I10 PRIMARY HYPERTENSION: ICD-10-CM

## 2023-11-03 LAB — HBA1C MFR BLD: 8.5 % (ref 0–5.6)

## 2023-11-03 PROCEDURE — 36415 COLL VENOUS BLD VENIPUNCTURE: CPT | Performed by: FAMILY MEDICINE

## 2023-11-03 PROCEDURE — 99214 OFFICE O/P EST MOD 30 MIN: CPT | Mod: 25 | Performed by: FAMILY MEDICINE

## 2023-11-03 PROCEDURE — 90686 IIV4 VACC NO PRSV 0.5 ML IM: CPT | Performed by: FAMILY MEDICINE

## 2023-11-03 PROCEDURE — 91320 SARSCV2 VAC 30MCG TRS-SUC IM: CPT | Performed by: FAMILY MEDICINE

## 2023-11-03 PROCEDURE — 90471 IMMUNIZATION ADMIN: CPT | Performed by: FAMILY MEDICINE

## 2023-11-03 PROCEDURE — 83036 HEMOGLOBIN GLYCOSYLATED A1C: CPT | Performed by: FAMILY MEDICINE

## 2023-11-03 PROCEDURE — 90480 ADMN SARSCOV2 VAC 1/ONLY CMP: CPT | Performed by: FAMILY MEDICINE

## 2023-11-03 NOTE — LETTER
November 3, 2023      Thierno Kendall   [address] [place] [id number]        Dear ,    We are writing to inform you of your test results.    Test results indicate you may require additional follow up, see comment below.  Please follow up with Mira English as we discussed.    Resulted Orders   Hemoglobin A1c   Result Value Ref Range    Hemoglobin A1C 8.5 (H) 0.0 - 5.6 %      Comment:      Normal <5.7%   Prediabetes 5.7-6.4%    Diabetes 6.5% or higher     Note: Adopted from ADA consensus guidelines.       If you have any questions or concerns, please call the clinic at the number listed above.       Sincerely,      Marcus Wolff MD

## 2023-11-03 NOTE — PROGRESS NOTES
"  Assessment & Plan     Type 2 diabetes mellitus with diabetic polyneuropathy, unspecified whether long term insulin use (H)  Under suboptimal control A1c is climbing most likely be higher at this point.  We will increase his metformin get him set up with diabetic education consider Ozempic.  Needs yearly eye check  - Hemoglobin A1c; Future  - metFORMIN (GLUCOPHAGE) 1000 MG tablet; Take 1 tablet (1,000 mg) by mouth 2 times daily (with meals)  - AMB Adult Diabetes Educator Referral; Future    Primary hypertension  Under reasonable control with losartan    Dyslipidemia  Controlled with atorvastatin    Gout, unspecified cause, unspecified chronicity, unspecified site  No flareups    BONITA (obstructive sleep apnea)  Has CPAP    Skin lesion  Suspect seborrheic keratoses if there is any remnants left after month or certainly if it does not resolve we will get him set up with dermatology             BMI:   Estimated body mass index is 33.84 kg/m  as calculated from the following:    Height as of this encounter: 1.93 m (6' 4\").    Weight as of this encounter: 126.1 kg (278 lb).           Marcus Wolff MD  [facility]    Niki Pa is a 63 year old, presenting for the following health issues: Overall patient doing well his blood sugars running little higher he thinks his A1c is can be 8.  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  Wife is in good health.  He has a 30-year-old autistic son who was at home his son has type 1 diabetes.  Diabetes and Wart      11/3/2023     2:01 PM   Additional Questions   Roomed by Christie Carmona to make an appt for DM eye exam.     History of Present Illness       Reason for visit:  Growth on right side of face and checkup  Symptom onset:  More than a month    He eats 2-3 servings of fruits and vegetables daily.He consumes 2 " "sweetened beverage(s) daily.He exercises with enough effort to increase his heart rate 20 to 29 minutes per day.  He exercises with enough effort to increase his heart rate 4 days per week.   He is taking medications regularly.           Patient Active Problem List   Diagnosis    Dyslipidemia    Gout    Hypertension    Obesity    Peripheral nerve disease    Seasonal allergic rhinitis    Type 2 diabetes mellitus (H)    BONITA (obstructive sleep apnea)   \  Current Outpatient Medications   Medication    atorvastatin (LIPITOR) 80 MG tablet    blood glucose (NO BRAND SPECIFIED) lancets standard    blood glucose (NO BRAND SPECIFIED) test strip    blood glucose monitoring (NO BRAND SPECIFIED) meter device kit    cinnamon 500 MG CAPS    losartan (COZAAR) 25 MG tablet    metFORMIN (GLUCOPHAGE) 1000 MG tablet     No current facility-administered medications for this visit.           Review of Systems   Constitutional, HEENT, cardiovascular, pulmonary, gi and gu systems are negative, except as otherwise noted.      Objective    /78 (BP Location: Right arm, Patient Position: Sitting, Cuff Size: Adult Large)   Pulse 78   Temp 97.8  F (36.6  C) (Tympanic)   Resp 20   Ht 1.93 m (6' 4\")   Wt 126.1 kg (278 lb)   SpO2 96%   BMI 33.84 kg/m    Body mass index is 33.84 kg/m .  Physical Exam   GENERAL: healthy, alert and no distress  NECK: no adenopathy, no asymmetry, masses, or scars and thyroid normal to palpation  RESP: lungs clear to auscultation - no rales, rhonchi or wheezes  CV: regular rate and rhythm, normal S1 S2, no S3 or S4, no murmur, click or rub, no peripheral edema and peripheral pulses strong  ABDOMEN: soft, nontender, no hepatosplenomegaly, no masses and bowel sounds normal  MS: no gross musculoskeletal defects noted, no edema    Office Visit on 03/15/2023   Component Date Value Ref Range Status    Hemoglobin A1C 03/15/2023 7.7 (H)  0.0 - 5.6 % Final    Normal <5.7%   Prediabetes 5.7-6.4%    Diabetes 6.5% or " higher     Note: Adopted from ADA consensus guidelines.    Creatinine Urine mg/dL 03/15/2023 168.0  mg/dL Final    The reference ranges have not been established in urine creatinine. The results should be integrated into the clinical context for interpretation.    Albumin Urine mg/L 03/15/2023 <12.0  mg/L Final    The reference ranges have not been established in urine albumin. The results should be integrated into the clinical context for interpretation.    Albumin Urine mg/g Cr 03/15/2023    Final    Unable to calculate, urine albumin and/or urine creatinine is outside detectable limits.  Microalbuminuria is defined as an albumin:creatinine ratio of 17 to 299 for males and 25 to 299 for females. A ratio of albumin:creatinine of 300 or higher is indicative of overt proteinuria.  Due to biologic variability, positive results should be confirmed by a second, first-morning random or 24-hour timed urine specimen. If there is discrepancy, a third specimen is recommended. When 2 out of 3 results are in the microalbuminuria range, this is evidence for incipient nephropathy and warrants increased efforts at glucose control, blood pressure control, and institution of therapy with an angiotensin-converting-enzyme (ACE) inhibitor (if the patient can tolerate it).      Sodium 03/15/2023 139  136 - 145 mmol/L Final    Potassium 03/15/2023 4.0  3.4 - 5.3 mmol/L Final    Chloride 03/15/2023 101  98 - 107 mmol/L Final    Carbon Dioxide (CO2) 03/15/2023 25  22 - 29 mmol/L Final    Anion Gap 03/15/2023 13  7 - 15 mmol/L Final    Urea Nitrogen 03/15/2023 14.9  8.0 - 23.0 mg/dL Final    Creatinine 03/15/2023 1.14  0.67 - 1.17 mg/dL Final    Calcium 03/15/2023 9.4  8.8 - 10.2 mg/dL Final    Glucose 03/15/2023 156 (H)  70 - 99 mg/dL Final    GFR Estimate 03/15/2023 73  >60 mL/min/1.73m2 Final    eGFR calculated using 2021 CKD-EPI equation.    Cholesterol 03/15/2023 219 (H)  <200 mg/dL Final    Triglycerides 03/15/2023 290 (H)  <150  mg/dL Final    Direct Measure HDL 03/15/2023 44  >=40 mg/dL Final    LDL Cholesterol Calculated 03/15/2023 117 (H)  <=100 mg/dL Final    Non HDL Cholesterol 03/15/2023 175 (H)  <130 mg/dL Final

## 2023-12-05 ENCOUNTER — ALLIED HEALTH/NURSE VISIT (OUTPATIENT)
Dept: EDUCATION SERVICES | Facility: CLINIC | Age: 63
End: 2023-12-05
Payer: COMMERCIAL

## 2023-12-05 VITALS — WEIGHT: 277.8 LBS | HEIGHT: 76 IN | BODY MASS INDEX: 33.83 KG/M2

## 2023-12-05 DIAGNOSIS — E78.5 DYSLIPIDEMIA: ICD-10-CM

## 2023-12-05 DIAGNOSIS — I10 PRIMARY HYPERTENSION: ICD-10-CM

## 2023-12-05 DIAGNOSIS — E66.9 OBESITY: ICD-10-CM

## 2023-12-05 DIAGNOSIS — E11.9 TYPE 2 DIABETES MELLITUS (H): Primary | ICD-10-CM

## 2023-12-05 PROCEDURE — G0108 DIAB MANAGE TRN  PER INDIV: HCPCS | Performed by: DIETITIAN, REGISTERED

## 2023-12-05 NOTE — LETTER
12/5/2023         RE: Thierno Kendall   [address] [facility] [address]        Dear Colleague,    Thank you for referring your patient, Thierno Kendall, to the [facility]. Please see a copy of my visit note below.    Diabetes Self-Management Education & Support    Presents for: Follow-up  Type II diabetes, Obesity, Dyslipidemia, HTN     Type of Service: In Person Visit      ASSESSMENT:  A1c increasing now above 8%.  Here today for an update in education and to discuss options for medications.      Patient's most recent   Lab Results   Component Value Date    A1C 8.5 11/03/2023     is not meeting goal of <8.0    Diabetes knowledge and skills assessment:   Patient is knowledgeable in diabetes management concepts related to: Healthy Eating, Being Active, Monitoring, Taking Medication, Problem Solving, Reducing Risks, and Healthy Coping    Continue education with the following diabetes management concepts: Healthy Eating, Taking Medication, and Problem Solving    Based on learning assessment above, most appropriate setting for further diabetes education would be: Individual setting.      PLAN  Blood Glucose testing   Test on 3 days per week (before and 2 hours after one meal)  Before eating goal 80 - 130mg/dL  2 hours after goal 80 - 150mg/dL     Total Carbohydrates (in grams) = Breakfast  30    Lunch  30    Supper  30    If desired snacks 15       Start Mounjaro at   2.5 mg weekly x 4 weeks increasing monthly as tolerated  5.0 mg weekly x 4 weeks increasing monthly as tolerated  7.5 mg weekly x 4 weeks increasing monthly as tolerated  10 mg weekly x 4 weeks increasing monthly as tolerated  12.5 mg weekly x 4 weeks increasing monthly as tolerated  15 mg weekly goal dose    Topics to cover at upcoming visits: Monitoring, Taking Medication, Reducing Risks, and Healthy Coping    Follow-up:         See Care Plan for co-developed, patient-state behavior change goals.  AVS  "provided for patient today.    Education Materials Provided:  Living Healthy with Diabetes, Goals for Your Diabetes Care, Medication Information on Mounjaro, and My Plate Planner      SUBJECTIVE/OBJECTIVE:  Presents for: Follow-up  Accompanied by: Self  Diabetes education in the past 24mo: No  Diabetes type: Type 2  Disease course: Improving  How confident are you filling out medical forms by yourself:: Extremely  Transportation concerns: No  Difficulty affording diabetes testing supplies?: No  Other concerns:: None  Cultural Influences/Ethnic Background:  Not  or     Diabetes Symptoms & Complications:  Fatigue: No  Neuropathy: No  Polydipsia: No  Polyphagia: No  Polyuria: No  Visual change: No  Slow healing wounds: No  Autonomic neuropathy: No  CVA: No  Heart disease: No  Nephropathy: No  Peripheral neuropathy: No  Peripheral Vascular Disease: No  Retinopathy: No  Sexual dysfunction: No    Patient Problem List and Family Medical History reviewed for relevant medical history, current medical status, and diabetes risk factors.    Vitals:  Ht 1.93 m (6' 4\")   Wt 126 kg (277 lb 12.8 oz)   BMI 33.81 kg/m    Estimated body mass index is 33.81 kg/m  as calculated from the following:    Height as of this encounter: 1.93 m (6' 4\").    Weight as of this encounter: 126 kg (277 lb 12.8 oz).   Last 3 BP:   BP Readings from Last 3 Encounters:   11/03/23 136/78   03/15/23 134/84   09/07/22 128/84       History   Smoking Status     Never   Smokeless Tobacco     Never       Labs:  Lab Results   Component Value Date    A1C 8.5 11/03/2023     Lab Results   Component Value Date     03/15/2023     12/21/2021     Lab Results   Component Value Date     03/15/2023     07/11/2018     Direct Measure HDL   Date Value Ref Range Status   03/15/2023 44 >=40 mg/dL Final   ]  GFR Estimate   Date Value Ref Range Status   03/15/2023 73 >60 mL/min/1.73m2 Final     Comment:     eGFR calculated using 2021 " "CKD-EPI equation.     No results found for: \"GFRESTBLACK\"  Lab Results   Component Value Date    CR 1.14 03/15/2023     No results found for: \"MICROALBUMIN\"    Healthy Eating:  Healthy Eating Assessed Today: Yes  Cultural/Sabianist diet restrictions?: No  Meal planning/habits: Calorie counting, Carb counting  How many times a week on average do you eat food made away from home (restaurant/take-out)?: 5+  Beverages: Other, Tea, Water, Diet soda  Has patient met with a dietitian in the past?: Yes    Vending machine often  [place] [employer] [place] [employer]   Occ skipping meals     Wife prepares   uTest   Vegetables     Being Active:  Being Active Assessed Today: Yes  Exercise:: Currently not exercising  Barrier to exercise: Time    Monitoring:  Monitoring Assessed Today: Yes  Did patient bring glucose meter to appointment? : No  Blood Glucose Meter: FreeStyle  Times checking blood sugar at home (number): 2  Times checking blood sugar at home (per): Day      Taking Medications:  Diabetes Medication(s)       Biguanides       metFORMIN (GLUCOPHAGE) 1000 MG tablet    Take 1 tablet (1,000 mg) by mouth 2 times daily (with meals)      Incretin Mimetic Agents       tirzepatide (MOUNJARO) 2.5 MG/0.5ML pen    Inject 2.5 mg Subcutaneous every 7 days     tirzepatide (MOUNJARO) 5 MG/0.5ML pen    Inject 5 mg Subcutaneous every 7 days            Current Treatments: Oral Medication (taken by mouth)  Problems taking diabetes medications regularly?: No  Diabetes medication side effects?: No    Problem Solving:  Problem Solving Assessed Today: Yes  Is the patient at risk for hypoglycemia?: No  Is the patient at risk for DKA?: No  Does patient have severe weather/disaster plan for diabetes management?: Yes  Does patient have sick day plan for diabetes management?: Yes              Reducing Risks:  Reducing Risks Assessed Today: Yes  Diabetes Risks: Age over 45 years, Sedentary Lifestyle, Family History, Hyperlipidemia  CAD Risks: " Diabetes Mellitus, Obesity, Male sex, Hypertension, Sedentary lifestyle  Has dilated eye exam at least once a year?: Yes  Sees dentist every 6 months?: No  Feet checked by healthcare provider in the last year?: Yes    Healthy Coping:  Healthy Coping Assessed Today: Yes  Emotional response to diabetes: Confidence diabetes can be controlled, Acceptance  Informal Support system:: Family  Stage of change: ACTION (Actively working towards change)  Support resources: Websites, Weight Management, In-person Offerings  Patient Activation Measure Survey Score:       No data to display                  Care Plan and Education Provided:  GLP-1 administration technique taught today. Patient verbalized understanding and was able to perform an accurate return demonstration of administration technique. Side effects were discussed, if patient has any abdominal pain, with or without nausea and/or vomiting, stop medication, call provider, clinic or go to the emergency room.  Care Plan: Diabetes   Updates made by Mira English RD since 12/10/2023 12:00 AM        Problem: HbA1C Not In Goal         Goal: Establish Regular Follow-Ups with PCP         Task: Discuss with PCP the recommended timing for patient's next follow up visit(s)    Responsible User: Mira English RD        Task: Discuss schedule for PCP visits with patient Completed 12/10/2023   Responsible User: Mira English RD        Goal: Get HbA1C Level in Goal         Task: Educate patient on diabetes education self-management topics    Responsible User: Mira English RD        Task: Educate patient on benefits of regular glucose monitoring Completed 12/10/2023   Responsible User: Mira English RD        Task: Refer patient to appropriate extended care team member, as needed (Medication Therapy Management, Behavioral Health, Physical Therapy, etc.)    Responsible User: Mira English RD        Task: Discuss diabetes treatment plan with patient Completed 12/10/2023    Responsible User: Mira English RD        Problem: Diabetes Self-Management Education Needed to Optimize Self-Care Behaviors         Goal: Understand diabetes pathophysiology and disease progression         Task: Provide education on diabetes pathophysiology and disease progression specfic to patient's diabetes type Completed 12/10/2023   Responsible User: Mira English RD        Goal: Healthy Eating - follow a healthy eating pattern for diabetes         Task: Provide education on portion control and consistency in amount, composition and timing of food intake    Responsible User: Mira English RD        Task: Provide education on managing carbohydrate intake (carbohydrate counting, plate planning method, etc.) Completed 12/10/2023   Responsible User: Mira English RD        Task: Provide education on weight management    Responsible User: Mira English RD        Task: Provide education on heart healthy eating Completed 12/10/2023   Responsible User: Mira English RD        Task: Provide education on eating out    Responsible User: Mira English RD        Task: Develop individualized healthy eating plan with patient    Responsible User: Mira English RD        Goal: Being Active - get regular physical activity, working up to at least 150 minutes per week         Task: Provide education on relationship of activity to glucose and precautions to take if at risk for low glucose Completed 12/10/2023   Responsible User: Mira English RD        Task: Discuss barriers to physical activity with patient    Responsible User: Mira English RD        Task: Develop physical activity plan with patient    Responsible User: Mira English RD        Task: Explore community resources including walking groups, assistance programs, and home videos    Responsible User: Mira English RD        Goal: Monitoring - monitor glucose and ketones as directed         Task: Provide education on blood glucose monitoring  (purpose, proper technique, frequency, glucose targets, interpreting results, when to use glucose control solution, sharps disposal) Completed 12/10/2023   Responsible User: Mira English RD        Task: Provide education on continuous glucose monitoring (sensor placement, use of nilay or /reader, understanding glucose trends, alerts and alarms, differences between sensor glucose and blood glucose)    Responsible User: Mira English RD        Task: Provide education on ketone monitoring (when to monitor, frequency, etc.)    Responsible User: Mira English RD        Goal: Taking Medication - patient is consistently taking medications as directed    This Visit's Progress: 100%   Note:    Pt to take diabetes medication as directed       Task: Provide education on action of prescribed medication, including when to take and possible side effects Completed 12/10/2023   Responsible User: Mira English RD        Task: Provide education on insulin and injectable diabetes medications, including administration, storage, site selection and rotation for injection sites Completed 12/10/2023   Responsible User: Mira English RD        Task: Discuss barriers to medication adherence with patient and provide management technique ideas as appropriate    Responsible User: Mira English RD        Task: Provide education on frequency and refill details of medications    Responsible User: Mira English RD        Goal: Problem Solving - know how to prevent and manage short-term diabetes complications         Task: Provide education on high blood glucose - causes, signs/symptoms, prevention and treatment Completed 12/10/2023   Responsible User: Mira English RD        Task: Provide education on low blood glucose - causes, signs/symptoms, prevention, treatment, carrying a carbohydrate source at all times, and medical identification Completed 12/10/2023   Responsible User: Mira English RD        Task: Provide  education on safe travel with diabetes    Responsible User: Mira English RD        Task: Provide education on how to care for diabetes on sick days    Responsible User: Mira English RD        Task: Provide education on when to call a health care provider    Responsible User: Mira English RD        Goal: Reducing Risks - know how to prevent and treat long-term diabetes complications         Task: Provide education on major complications of diabetes, prevention, early diagnostic measures and treatment of complications    Responsible User: Mira English RD        Task: Provide education on recommended care for dental, eye and foot health Completed 12/10/2023   Responsible User: Mira English RD        Task: Provide education on Hemoglobin A1c - goals and relationship to blood glucose levels Completed 12/10/2023   Responsible User: Mira English RD        Task: Provide education on recommendations for heart health - lipid levels and goals, blood pressure and goals, and aspirin therapy, if indicated    Responsible User: Mira English RD        Task: Provide education on tobacco cessation    Responsible User: Mira English RD        Goal: Healthy Coping - use available resources to cope with the challenges of managing diabetes         Task: Discuss recognizing feelings about having diabetes    Responsible User: Mira English RD        Task: Provide education on the benefits of making appropriate lifestyle changes Completed 12/10/2023   Responsible User: Mira English RD        Task: Provide education on benefits of utilizing support systems    Responsible User: Mira English RD        Task: Discuss methods for coping with stress    Responsible User: Mira English RD        Task: Provide education on when to seek professional counseling    Responsible User: Mira English RD            Time Spent: 60 minutes  Encounter Type: Individual    Any diabetes medication dose changes were made via the  CDE Protocol per the patient's referring provider. A copy of this encounter was shared with the provider.

## 2023-12-05 NOTE — PROGRESS NOTES
Diabetes Self-Management Education & Support    Presents for: Follow-up  Type II diabetes, Obesity, Dyslipidemia, HTN     Type of Service: In Person Visit      ASSESSMENT:  A1c increasing now above 8%.  Here today for an update in education and to discuss options for medications.      Patient's most recent   Lab Results   Component Value Date    A1C 8.5 11/03/2023     is not meeting goal of <8.0    Diabetes knowledge and skills assessment:   Patient is knowledgeable in diabetes management concepts related to: Healthy Eating, Being Active, Monitoring, Taking Medication, Problem Solving, Reducing Risks, and Healthy Coping    Continue education with the following diabetes management concepts: Healthy Eating, Taking Medication, and Problem Solving    Based on learning assessment above, most appropriate setting for further diabetes education would be: Individual setting.      PLAN  Blood Glucose testing   Test on 3 days per week (before and 2 hours after one meal)  Before eating goal 80 - 130mg/dL  2 hours after goal 80 - 150mg/dL     Total Carbohydrates (in grams) = Breakfast  30    Lunch  30    Supper  30    If desired snacks 15       Start Mounjaro at   2.5 mg weekly x 4 weeks increasing monthly as tolerated  5.0 mg weekly x 4 weeks increasing monthly as tolerated  7.5 mg weekly x 4 weeks increasing monthly as tolerated  10 mg weekly x 4 weeks increasing monthly as tolerated  12.5 mg weekly x 4 weeks increasing monthly as tolerated  15 mg weekly goal dose    Topics to cover at upcoming visits: Monitoring, Taking Medication, Reducing Risks, and Healthy Coping    Follow-up:         See Care Plan for co-developed, patient-state behavior change goals.  AVS provided for patient today.    Education Materials Provided:  Living Healthy with Diabetes, Goals for Your Diabetes Care, Medication Information on Mounjaro, and My Plate Planner      SUBJECTIVE/OBJECTIVE:  Presents for: Follow-up  Accompanied by: Self  Diabetes  "education in the past 24mo: No  Diabetes type: Type 2  Disease course: Improving  How confident are you filling out medical forms by yourself:: Extremely  Transportation concerns: No  Difficulty affording diabetes testing supplies?: No  Other concerns:: None  Cultural Influences/Ethnic Background:  Not  or     Diabetes Symptoms & Complications:  Fatigue: No  Neuropathy: No  Polydipsia: No  Polyphagia: No  Polyuria: No  Visual change: No  Slow healing wounds: No  Autonomic neuropathy: No  CVA: No  Heart disease: No  Nephropathy: No  Peripheral neuropathy: No  Peripheral Vascular Disease: No  Retinopathy: No  Sexual dysfunction: No    Patient Problem List and Family Medical History reviewed for relevant medical history, current medical status, and diabetes risk factors.    Vitals:  Ht 1.93 m (6' 4\")   Wt 126 kg (277 lb 12.8 oz)   BMI 33.81 kg/m    Estimated body mass index is 33.81 kg/m  as calculated from the following:    Height as of this encounter: 1.93 m (6' 4\").    Weight as of this encounter: 126 kg (277 lb 12.8 oz).   Last 3 BP:   BP Readings from Last 3 Encounters:   11/03/23 136/78   03/15/23 134/84   09/07/22 128/84       History   Smoking Status    Never   Smokeless Tobacco    Never       Labs:  Lab Results   Component Value Date    A1C 8.5 11/03/2023     Lab Results   Component Value Date     03/15/2023     12/21/2021     Lab Results   Component Value Date     03/15/2023     07/11/2018     Direct Measure HDL   Date Value Ref Range Status   03/15/2023 44 >=40 mg/dL Final   ]  GFR Estimate   Date Value Ref Range Status   03/15/2023 73 >60 mL/min/1.73m2 Final     Comment:     eGFR calculated using 2021 CKD-EPI equation.     No results found for: \"GFRESTBLACK\"  Lab Results   Component Value Date    CR 1.14 03/15/2023     No results found for: \"MICROALBUMIN\"    Healthy Eating:  Healthy Eating Assessed Today: Yes  Cultural/Buddhism diet restrictions?: No  Meal " planning/habits: Calorie counting, Carb counting  How many times a week on average do you eat food made away from home (restaurant/take-out)?: 5+  Beverages: Other, Tea, Water, Diet soda  Has patient met with a dietitian in the past?: Yes    Vending machine often  [employer] - meatballs   Occ skipping meals     Wife prepares   L[employer]   Vegetables     Being Active:  Being Active Assessed Today: Yes  Exercise:: Currently not exercising  Barrier to exercise: Time    Monitoring:  Monitoring Assessed Today: Yes  Did patient bring glucose meter to appointment? : No  Blood Glucose Meter: FreeStyle  Times checking blood sugar at home (number): 2  Times checking blood sugar at home (per): Day      Taking Medications:  Diabetes Medication(s)       Biguanides       metFORMIN (GLUCOPHAGE) 1000 MG tablet    Take 1 tablet (1,000 mg) by mouth 2 times daily (with meals)      Incretin Mimetic Agents       tirzepatide (MOUNJARO) 2.5 MG/0.5ML pen    Inject 2.5 mg Subcutaneous every 7 days     tirzepatide (MOUNJARO) 5 MG/0.5ML pen    Inject 5 mg Subcutaneous every 7 days            Current Treatments: Oral Medication (taken by mouth)  Problems taking diabetes medications regularly?: No  Diabetes medication side effects?: No    Problem Solving:  Problem Solving Assessed Today: Yes  Is the patient at risk for hypoglycemia?: No  Is the patient at risk for DKA?: No  Does patient have severe weather/disaster plan for diabetes management?: Yes  Does patient have sick day plan for diabetes management?: Yes              Reducing Risks:  Reducing Risks Assessed Today: Yes  Diabetes Risks: Age over 45 years, Sedentary Lifestyle, Family History, Hyperlipidemia  CAD Risks: Diabetes Mellitus, Obesity, Male sex, Hypertension, Sedentary lifestyle  Has dilated eye exam at least once a year?: Yes  Sees dentist every 6 months?: No  Feet checked by healthcare provider in the last year?: Yes    Healthy Coping:  Healthy Coping Assessed  Today: Yes  Emotional response to diabetes: Confidence diabetes can be controlled, Acceptance  Informal Support system:: Family  Stage of change: ACTION (Actively working towards change)  Support resources: Websites, Weight Management, In-person Offerings  Patient Activation Measure Survey Score:       No data to display                  Care Plan and Education Provided:  GLP-1 administration technique taught today. Patient verbalized understanding and was able to perform an accurate return demonstration of administration technique. Side effects were discussed, if patient has any abdominal pain, with or without nausea and/or vomiting, stop medication, call provider, clinic or go to the emergency room.  Care Plan: Diabetes   Updates made by Mira English RD since 12/10/2023 12:00 AM        Problem: HbA1C Not In Goal         Goal: Establish Regular Follow-Ups with PCP         Task: Discuss with PCP the recommended timing for patient's next follow up visit(s)    Responsible User: Mira English RD        Task: Discuss schedule for PCP visits with patient Completed 12/10/2023   Responsible User: Mira English RD        Goal: Get HbA1C Level in Goal         Task: Educate patient on diabetes education self-management topics    Responsible User: Mira English RD        Task: Educate patient on benefits of regular glucose monitoring Completed 12/10/2023   Responsible User: Mira English RD        Task: Refer patient to appropriate extended care team member, as needed (Medication Therapy Management, Behavioral Health, Physical Therapy, etc.)    Responsible User: Mira English RD        Task: Discuss diabetes treatment plan with patient Completed 12/10/2023   Responsible User: Mira English RD        Problem: Diabetes Self-Management Education Needed to Optimize Self-Care Behaviors         Goal: Understand diabetes pathophysiology and disease progression         Task: Provide education on diabetes pathophysiology  and disease progression specfic to patient's diabetes type Completed 12/10/2023   Responsible User: Mira English RD        Goal: Healthy Eating - follow a healthy eating pattern for diabetes         Task: Provide education on portion control and consistency in amount, composition and timing of food intake    Responsible User: Mira English RD        Task: Provide education on managing carbohydrate intake (carbohydrate counting, plate planning method, etc.) Completed 12/10/2023   Responsible User: Mira English RD        Task: Provide education on weight management    Responsible User: Mira English RD        Task: Provide education on heart healthy eating Completed 12/10/2023   Responsible User: Mira English RD        Task: Provide education on eating out    Responsible User: Mira English RD        Task: Develop individualized healthy eating plan with patient    Responsible User: Mira English RD        Goal: Being Active - get regular physical activity, working up to at least 150 minutes per week         Task: Provide education on relationship of activity to glucose and precautions to take if at risk for low glucose Completed 12/10/2023   Responsible User: Mira English RD        Task: Discuss barriers to physical activity with patient    Responsible User: Mira English RD        Task: Develop physical activity plan with patient    Responsible User: Mira English RD        Task: Explore community resources including walking groups, assistance programs, and home videos    Responsible User: Mira English RD        Goal: Monitoring - monitor glucose and ketones as directed         Task: Provide education on blood glucose monitoring (purpose, proper technique, frequency, glucose targets, interpreting results, when to use glucose control solution, sharps disposal) Completed 12/10/2023   Responsible User: Mira English RD        Task: Provide education on continuous glucose monitoring (sensor  placement, use of nilay or /reader, understanding glucose trends, alerts and alarms, differences between sensor glucose and blood glucose)    Responsible User: Mira English RD        Task: Provide education on ketone monitoring (when to monitor, frequency, etc.)    Responsible User: Mira English RD        Goal: Taking Medication - patient is consistently taking medications as directed    This Visit's Progress: 100%   Note:    Pt to take diabetes medication as directed       Task: Provide education on action of prescribed medication, including when to take and possible side effects Completed 12/10/2023   Responsible User: Mira English RD        Task: Provide education on insulin and injectable diabetes medications, including administration, storage, site selection and rotation for injection sites Completed 12/10/2023   Responsible User: Mira English RD        Task: Discuss barriers to medication adherence with patient and provide management technique ideas as appropriate    Responsible User: Mira English RD        Task: Provide education on frequency and refill details of medications    Responsible User: Mira English RD        Goal: Problem Solving - know how to prevent and manage short-term diabetes complications         Task: Provide education on high blood glucose - causes, signs/symptoms, prevention and treatment Completed 12/10/2023   Responsible User: Mira English RD        Task: Provide education on low blood glucose - causes, signs/symptoms, prevention, treatment, carrying a carbohydrate source at all times, and medical identification Completed 12/10/2023   Responsible User: Mira English RD        Task: Provide education on safe travel with diabetes    Responsible User: Mira English RD        Task: Provide education on how to care for diabetes on sick days    Responsible User: Mira English RD        Task: Provide education on when to call a health care provider     Responsible User: Mira English RD        Goal: Reducing Risks - know how to prevent and treat long-term diabetes complications         Task: Provide education on major complications of diabetes, prevention, early diagnostic measures and treatment of complications    Responsible User: Mira English RD        Task: Provide education on recommended care for dental, eye and foot health Completed 12/10/2023   Responsible User: Mira English RD        Task: Provide education on Hemoglobin A1c - goals and relationship to blood glucose levels Completed 12/10/2023   Responsible User: Mira English RD        Task: Provide education on recommendations for heart health - lipid levels and goals, blood pressure and goals, and aspirin therapy, if indicated    Responsible User: Mira English RD        Task: Provide education on tobacco cessation    Responsible User: Mira English RD        Goal: Healthy Coping - use available resources to cope with the challenges of managing diabetes         Task: Discuss recognizing feelings about having diabetes    Responsible User: Mira English RD        Task: Provide education on the benefits of making appropriate lifestyle changes Completed 12/10/2023   Responsible User: Mira English RD        Task: Provide education on benefits of utilizing support systems    Responsible User: Mira English RD        Task: Discuss methods for coping with stress    Responsible User: Mira English RD        Task: Provide education on when to seek professional counseling    Responsible User: Mira English RD            Time Spent: 60 minutes  Encounter Type: Individual    Any diabetes medication dose changes were made via the CDE Protocol per the patient's referring provider. A copy of this encounter was shared with the provider.

## 2023-12-05 NOTE — PATIENT INSTRUCTIONS
1st choice Mounjaro or Ozempic    2000 calories/ day should give weight loss        Goals:  Practice healthy stress management and mindful eating - think are you physically hungry or are you bored, stressed, emotional etc, make of list of things to do besides eat.    Try to get good quality sleep with a goal of 7-8 hours per night.  Stay physically active daily.  Recommend working up to a total of 30 minutes on 5 days/ week.  Recommend a fitness tracker.     Eat in a healthy way- eliminate trans fats, limit saturated fats and added sugars; follow the plate method - picture above.  Keep a food record ([employer], [employer]).    A meal is 3 or more food groups; make it colorful for better nutrition.    Total Carbohydrates (in grams) = Breakfast  30    Lunch  30    Supper  30    If desired snacks 15                                   Blood Glucose testing   Test on 3 days per week (before and 2 hours after one meal)  Before eating goal 80 - 130mg/dL  2 hours after goal 80 - 150mg/dL     In Type 2 Diabetes, you may not be making enough gut hormones that help manage appetite, stomach fullness, glucose levels after meals and weight.     You should NOT use gut hormones if you have a history of pancreatitis or thyroid cancer in you or your family.      Mounjaro is a once a week non-insulin injection with a combination of 2 gut hormones for weight loss           Benefits: This medication will help you feel full with a smaller amount of food. It will lower your blood sugars after meals. The benefits are improved blood sugars, decrease in appetite and possible weight loss.           Side effects are more likely in the first 10-14 days of use and usually decrease after that. You may experience nausea if you eat too much with this medication. Eat LESS to avoid nausea. You may notice a change in your bowel movements. Heartburn or indigestion are more likely with this medication especially if you eat a larger amount of food or you  lay down after eating. If you experience extreme abdominal pain that radiates into your back, STOP THIS MEDICATION IMMEDIATELY AND CALL YOUR PROVIDER.           With Mounjaro, if you are of child-bearing age and on oral contraception, use non-oral, barrier contraception for 4 weeks at the start AND at each dose change in Mounjaro.           Do not take Mounjaro if you are pregnant, planning to become pregnant or breastfeeding. Check with your healthcare provider about an alternative.           With Mounjaro, there are different doses which are gradually increased to limit side effects. Start with 2.5mg each week.     Visit the Mounjaro for more information on product and vouchers.

## 2023-12-10 RX ORDER — TIRZEPATIDE 2.5 MG/.5ML
2.5 INJECTION, SOLUTION SUBCUTANEOUS
Qty: 2 ML | Refills: 0 | Status: SHIPPED | OUTPATIENT
Start: 2023-12-10 | End: 2024-01-26

## 2023-12-10 RX ORDER — TIRZEPATIDE 5 MG/.5ML
5 INJECTION, SOLUTION SUBCUTANEOUS
Qty: 2 ML | Refills: 0 | Status: SHIPPED | OUTPATIENT
Start: 2024-01-07 | End: 2024-02-23

## 2023-12-13 ENCOUNTER — TELEPHONE (OUTPATIENT)
Dept: FAMILY MEDICINE | Facility: CLINIC | Age: 63
End: 2023-12-13
Payer: COMMERCIAL

## 2023-12-13 NOTE — TELEPHONE ENCOUNTER
Prior Authorization Retail Medication Request    Medication/Dose: Mounjaro 2.5mg and 5mg  Diagnosis and ICD code (if different than what is on RX):  same  New/renewal/insurance change PA/secondary ins. PA:  Previously Tried and Failed:  n/a  Rationale:  unknown    Insurance   Primary:   Insurance ID:  [id number]

## 2023-12-14 ENCOUNTER — TRANSFERRED RECORDS (OUTPATIENT)
Dept: HEALTH INFORMATION MANAGEMENT | Facility: CLINIC | Age: 63
End: 2023-12-14
Payer: COMMERCIAL

## 2023-12-14 LAB — RETINOPATHY: NEGATIVE

## 2023-12-17 ENCOUNTER — TELEPHONE (OUTPATIENT)
Dept: FAMILY MEDICINE | Facility: CLINIC | Age: 63
End: 2023-12-17
Payer: COMMERCIAL

## 2023-12-18 NOTE — TELEPHONE ENCOUNTER
Prior Authorization Approval    Medication: MOUNJARO 2.5 MG/0.5ML SC SOPN  Authorization Effective Date: 11/17/2023  Authorization Expiration Date: 12/16/2024  Approved Dose/Quantity:   Reference #:     Insurance Company: [employer]-Specialty PA's - Phone [phone] Fax [phone]  Expected CoPay: $    CoPay Card Available:      Financial Assistance Needed:   Which Pharmacy is filling the prescription: [facility] #[id number] [place] [address] [address]  AT [facility] OF [address] & [facility]  Pharmacy Notified: YES  Patient Notified: **Instructed pharmacy to notify patient when script is ready to /ship.**

## 2023-12-18 NOTE — TELEPHONE ENCOUNTER
PA Initiation    Medication: MOUNJARO 5 MG/0.5ML SC SOPN  Insurance Company: [employer]-Specialty PA's - Phone [phone] Fax [phone]  [facility]arm[facility] the Rx: [id number] [address] AT [facility] & [address]  Filling Pharmacy Phone: [phone]  Filling Pharmacy Fax: [phone]  Start Date: 12/17/2023

## 2023-12-18 NOTE — TELEPHONE ENCOUNTER
Prior Authorization Approval    Medication: MOUNJARO 5 MG/0.5ML SC SOPN  Authorization Effective Date: 11/17/2023  Authorization Expiration Date: 4/15/2024  Approved Dose/Quantity:   Reference #:     Insurance Company: [employer]-Specialty PA's - Phone [phone] Fax [phone]  Expected CoPay: $    CoPay Card Available:      Financial Assistance Needed:   Which Pharmacy is filling the prescription: [facility] #[id number] [place] [id number] [address]  AT [facility] OF MAIN & [facility]  Pharmacy Notified: YES  Patient Notified: **Instructed pharmacy to notify patient when script is ready to /ship.**

## 2023-12-18 NOTE — TELEPHONE ENCOUNTER
PA Initiation    Medication: MOUNJARO 2.5 MG/0.5ML SC SOPN  Insurance Company: [employer] Non-Specialty PA's - Phone [phone] Fax [phone]  Pharmacy Filling the Rx: [id number] [address] AT [facility] & [address]  Filling Pharmacy Phone: [phone]  Filling Pharmacy Fax: [phone]  Start Date: 12/17/2023

## 2024-01-26 DIAGNOSIS — E11.9 TYPE 2 DIABETES MELLITUS (H): ICD-10-CM

## 2024-01-26 DIAGNOSIS — E66.9 OBESITY: ICD-10-CM

## 2024-01-26 DIAGNOSIS — I10 PRIMARY HYPERTENSION: ICD-10-CM

## 2024-01-26 DIAGNOSIS — E78.5 DYSLIPIDEMIA: ICD-10-CM

## 2024-01-29 RX ORDER — TIRZEPATIDE 2.5 MG/.5ML
2.5 INJECTION, SOLUTION SUBCUTANEOUS
Qty: 2 ML | Refills: 0 | Status: SHIPPED | OUTPATIENT
Start: 2024-01-29 | End: 2024-04-25

## 2024-02-23 DIAGNOSIS — E11.9 TYPE 2 DIABETES MELLITUS (H): ICD-10-CM

## 2024-02-23 DIAGNOSIS — I10 PRIMARY HYPERTENSION: ICD-10-CM

## 2024-02-23 DIAGNOSIS — E78.5 DYSLIPIDEMIA: ICD-10-CM

## 2024-02-23 DIAGNOSIS — E66.9 OBESITY: ICD-10-CM

## 2024-02-26 RX ORDER — TIRZEPATIDE 5 MG/.5ML
5 INJECTION, SOLUTION SUBCUTANEOUS
Qty: 2 ML | Refills: 0 | Status: SHIPPED | OUTPATIENT
Start: 2024-02-26 | End: 2024-04-05

## 2024-03-01 ENCOUNTER — ANCILLARY PROCEDURE (OUTPATIENT)
Dept: GENERAL RADIOLOGY | Facility: CLINIC | Age: 64
End: 2024-03-01
Attending: FAMILY MEDICINE
Payer: COMMERCIAL

## 2024-03-01 ENCOUNTER — OFFICE VISIT (OUTPATIENT)
Dept: FAMILY MEDICINE | Facility: CLINIC | Age: 64
End: 2024-03-01
Payer: COMMERCIAL

## 2024-03-01 VITALS
HEIGHT: 76 IN | OXYGEN SATURATION: 98 % | WEIGHT: 266.6 LBS | DIASTOLIC BLOOD PRESSURE: 81 MMHG | SYSTOLIC BLOOD PRESSURE: 126 MMHG | BODY MASS INDEX: 32.47 KG/M2 | RESPIRATION RATE: 16 BRPM | HEART RATE: 98 BPM | TEMPERATURE: 97.8 F

## 2024-03-01 DIAGNOSIS — S93.401A SPRAIN OF RIGHT ANKLE, UNSPECIFIED LIGAMENT, INITIAL ENCOUNTER: ICD-10-CM

## 2024-03-01 DIAGNOSIS — S93.401A SPRAIN OF RIGHT ANKLE, UNSPECIFIED LIGAMENT, INITIAL ENCOUNTER: Primary | ICD-10-CM

## 2024-03-01 PROCEDURE — 73610 X-RAY EXAM OF ANKLE: CPT | Mod: TC | Performed by: RADIOLOGY

## 2024-03-01 PROCEDURE — 99213 OFFICE O/P EST LOW 20 MIN: CPT | Performed by: FAMILY MEDICINE

## 2024-03-01 NOTE — PROGRESS NOTES
"  Assessment & Plan     Sprain of right ankle, unspecified ligament, initial encounter  Patient with ankle sprain he has an immobilizer at home which she will wear.  Otherwise ice Tylenol ibuprofen his knees been little bit sore as well no heart findings there follow-up in a few weeks if not improving sooner if worse  - XR Ankle Right G/E 3 Views; Future            BMI  Estimated body mass index is 32.45 kg/m  as calculated from the following:    Height as of this encounter: 1.93 m (6' 4\").    Weight as of this encounter: 120.9 kg (266 lb 9.6 oz).             Niki Pa is a 63 year old, presenting for the following health issues: He was wearing a cast boot that he had but it seemed to bother his knee now his knee is a little sore to  Trauma (Patient's dog ran into him on Sunday and he twisted his left ankle. Area still swollen and painful-unable to sleep at night. )      3/1/2024    10:08 AM   Additional Questions   Roomed by Brenda GRIMALDO   Accompanied by Self     Trauma    History of Present Illness       Reason for visit:  Sprained ankle and knee pain  Symptom onset:  3-7 days ago    He eats 2-3 servings of fruits and vegetables daily.He consumes 2 sweetened beverage(s) daily.He exercises with enough effort to increase his heart rate 10 to 19 minutes per day.  He exercises with enough effort to increase his heart rate 5 days per week.   He is taking medications regularly.                 Review of Systems  Constitutional, HEENT, cardiovascular, pulmonary, gi and gu systems are negative, except as otherwise noted.      Objective    /81 (BP Location: Right arm, Patient Position: Sitting, Cuff Size: Adult Large)   Pulse 98   Temp 97.8  F (36.6  C)   Resp 16   Ht 1.93 m (6' 4\")   Wt 120.9 kg (266 lb 9.6 oz)   SpO2 98%   BMI 32.45 kg/m    Body mass index is 32.45 kg/m .  Physical Exam   Alert no apparent stress left knee 3 with full range of motion some mild medial and lateral joint line tenderness " no instability no effusion left ankle reveals some mild medial and lateral joint line tenderness negative drawer moderate swelling CMS intact    Xray - Reviewed and interpreted by me.  Negative        Signed Electronically by: Marcus Wolff MD

## 2024-03-11 DIAGNOSIS — E11.42 TYPE 2 DIABETES MELLITUS WITH DIABETIC POLYNEUROPATHY, UNSPECIFIED WHETHER LONG TERM INSULIN USE (H): ICD-10-CM

## 2024-03-12 RX ORDER — ATORVASTATIN CALCIUM 80 MG/1
80 TABLET, FILM COATED ORAL DAILY
Qty: 90 TABLET | Refills: 2 | Status: SHIPPED | OUTPATIENT
Start: 2024-03-12 | End: 2024-04-12

## 2024-04-05 ENCOUNTER — TELEPHONE (OUTPATIENT)
Dept: FAMILY MEDICINE | Facility: CLINIC | Age: 64
End: 2024-04-05
Payer: COMMERCIAL

## 2024-04-05 DIAGNOSIS — E11.42 TYPE 2 DIABETES MELLITUS WITH DIABETIC POLYNEUROPATHY, UNSPECIFIED WHETHER LONG TERM INSULIN USE (H): Primary | ICD-10-CM

## 2024-04-05 DIAGNOSIS — E66.9 OBESITY: ICD-10-CM

## 2024-04-05 DIAGNOSIS — I10 PRIMARY HYPERTENSION: ICD-10-CM

## 2024-04-05 DIAGNOSIS — E78.5 DYSLIPIDEMIA: ICD-10-CM

## 2024-04-05 DIAGNOSIS — E11.9 TYPE 2 DIABETES MELLITUS (H): ICD-10-CM

## 2024-04-05 RX ORDER — TIRZEPATIDE 5 MG/.5ML
5 INJECTION, SOLUTION SUBCUTANEOUS
Qty: 2 ML | Refills: 11 | Status: SHIPPED | OUTPATIENT
Start: 2024-04-05 | End: 2024-04-12

## 2024-04-05 NOTE — TELEPHONE ENCOUNTER
Medication Question or Refill        What medication are you calling about (include dose and sig)?: Wyatt, 5mg    Preferred Pharmacy:   [facility] - [address] [address]  Phone: [phone] Fax: [phone]      Controlled Substance Agreement on file:   CSA -- Patient Level:    CSA: None found at the patient level.       Who prescribed the medication?: pcp    Do you need a refill? Yes    When did you use the medication last? 3/29/24    Patient offered an appointment? Yes: 4/12    Do you have any questions or concerns?  Yes: he is out of this medication, no refills per pharmacy      Okay to leave a detailed message?: Yes at Cell number on file:    Telephone Information:   Mobile [phone]

## 2024-04-12 ENCOUNTER — OFFICE VISIT (OUTPATIENT)
Dept: FAMILY MEDICINE | Facility: CLINIC | Age: 64
End: 2024-04-12
Payer: COMMERCIAL

## 2024-04-12 VITALS
DIASTOLIC BLOOD PRESSURE: 83 MMHG | TEMPERATURE: 98.3 F | SYSTOLIC BLOOD PRESSURE: 138 MMHG | HEART RATE: 78 BPM | BODY MASS INDEX: 31.66 KG/M2 | WEIGHT: 260 LBS | HEIGHT: 76 IN | RESPIRATION RATE: 20 BRPM

## 2024-04-12 DIAGNOSIS — E78.5 DYSLIPIDEMIA: ICD-10-CM

## 2024-04-12 DIAGNOSIS — M10.9 GOUT, UNSPECIFIED CAUSE, UNSPECIFIED CHRONICITY, UNSPECIFIED SITE: ICD-10-CM

## 2024-04-12 DIAGNOSIS — G47.33 OSA (OBSTRUCTIVE SLEEP APNEA): ICD-10-CM

## 2024-04-12 DIAGNOSIS — Z11.4 SCREENING FOR HIV (HUMAN IMMUNODEFICIENCY VIRUS): ICD-10-CM

## 2024-04-12 DIAGNOSIS — E11.42 TYPE 2 DIABETES MELLITUS WITH DIABETIC POLYNEUROPATHY, UNSPECIFIED WHETHER LONG TERM INSULIN USE (H): Primary | ICD-10-CM

## 2024-04-12 DIAGNOSIS — Z11.59 NEED FOR HEPATITIS C SCREENING TEST: ICD-10-CM

## 2024-04-12 LAB
ALBUMIN SERPL BCG-MCNC: 4.2 G/DL (ref 3.5–5.2)
ALP SERPL-CCNC: 117 U/L (ref 40–150)
ALT SERPL W P-5'-P-CCNC: 14 U/L (ref 0–70)
ANION GAP SERPL CALCULATED.3IONS-SCNC: 11 MMOL/L (ref 7–15)
AST SERPL W P-5'-P-CCNC: 18 U/L (ref 0–45)
BASOPHILS # BLD AUTO: 0 10E3/UL (ref 0–0.2)
BASOPHILS NFR BLD AUTO: 0 %
BILIRUB SERPL-MCNC: 0.7 MG/DL
BUN SERPL-MCNC: 13.1 MG/DL (ref 8–23)
CALCIUM SERPL-MCNC: 9.1 MG/DL (ref 8.8–10.2)
CHLORIDE SERPL-SCNC: 103 MMOL/L (ref 98–107)
CHOLEST SERPL-MCNC: 176 MG/DL
CREAT SERPL-MCNC: 1.08 MG/DL (ref 0.67–1.17)
CREAT UR-MCNC: 186 MG/DL
DEPRECATED HCO3 PLAS-SCNC: 25 MMOL/L (ref 22–29)
EGFRCR SERPLBLD CKD-EPI 2021: 77 ML/MIN/1.73M2
EOSINOPHIL # BLD AUTO: 0.3 10E3/UL (ref 0–0.7)
EOSINOPHIL NFR BLD AUTO: 3 %
ERYTHROCYTE [DISTWIDTH] IN BLOOD BY AUTOMATED COUNT: 13.6 % (ref 10–15)
FASTING STATUS PATIENT QL REPORTED: YES
GLUCOSE SERPL-MCNC: 125 MG/DL (ref 70–99)
HBA1C MFR BLD: 6.8 % (ref 0–5.6)
HCT VFR BLD AUTO: 40.9 % (ref 40–53)
HDLC SERPL-MCNC: 44 MG/DL
HGB BLD-MCNC: 13.1 G/DL (ref 13.3–17.7)
IMM GRANULOCYTES # BLD: 0 10E3/UL
IMM GRANULOCYTES NFR BLD: 0 %
LDLC SERPL CALC-MCNC: 100 MG/DL
LYMPHOCYTES # BLD AUTO: 1.7 10E3/UL (ref 0.8–5.3)
LYMPHOCYTES NFR BLD AUTO: 17 %
MCH RBC QN AUTO: 27.3 PG (ref 26.5–33)
MCHC RBC AUTO-ENTMCNC: 32 G/DL (ref 31.5–36.5)
MCV RBC AUTO: 85 FL (ref 78–100)
MICROALBUMIN UR-MCNC: 13.7 MG/L
MICROALBUMIN/CREAT UR: 7.37 MG/G CR (ref 0–17)
MONOCYTES # BLD AUTO: 0.9 10E3/UL (ref 0–1.3)
MONOCYTES NFR BLD AUTO: 9 %
NEUTROPHILS # BLD AUTO: 7.4 10E3/UL (ref 1.6–8.3)
NEUTROPHILS NFR BLD AUTO: 72 %
NONHDLC SERPL-MCNC: 132 MG/DL
PLATELET # BLD AUTO: 263 10E3/UL (ref 150–450)
POTASSIUM SERPL-SCNC: 4.2 MMOL/L (ref 3.4–5.3)
PROT SERPL-MCNC: 7.1 G/DL (ref 6.4–8.3)
RBC # BLD AUTO: 4.8 10E6/UL (ref 4.4–5.9)
SODIUM SERPL-SCNC: 139 MMOL/L (ref 135–145)
TRIGL SERPL-MCNC: 162 MG/DL
WBC # BLD AUTO: 10.3 10E3/UL (ref 4–11)

## 2024-04-12 PROCEDURE — 82043 UR ALBUMIN QUANTITATIVE: CPT | Performed by: FAMILY MEDICINE

## 2024-04-12 PROCEDURE — 99214 OFFICE O/P EST MOD 30 MIN: CPT | Performed by: FAMILY MEDICINE

## 2024-04-12 PROCEDURE — 82570 ASSAY OF URINE CREATININE: CPT | Performed by: FAMILY MEDICINE

## 2024-04-12 PROCEDURE — 36415 COLL VENOUS BLD VENIPUNCTURE: CPT | Performed by: FAMILY MEDICINE

## 2024-04-12 PROCEDURE — 85025 COMPLETE CBC W/AUTO DIFF WBC: CPT | Mod: QW | Performed by: FAMILY MEDICINE

## 2024-04-12 PROCEDURE — 80061 LIPID PANEL: CPT | Performed by: FAMILY MEDICINE

## 2024-04-12 PROCEDURE — 80053 COMPREHEN METABOLIC PANEL: CPT | Performed by: FAMILY MEDICINE

## 2024-04-12 PROCEDURE — 83036 HEMOGLOBIN GLYCOSYLATED A1C: CPT | Performed by: FAMILY MEDICINE

## 2024-04-12 RX ORDER — LOSARTAN POTASSIUM 25 MG/1
25 TABLET ORAL DAILY
Qty: 90 TABLET | Refills: 3 | Status: SHIPPED | OUTPATIENT
Start: 2024-04-12

## 2024-04-12 RX ORDER — TIRZEPATIDE 5 MG/.5ML
5 INJECTION, SOLUTION SUBCUTANEOUS
Qty: 2 ML | Refills: 11 | Status: SHIPPED | OUTPATIENT
Start: 2024-04-12

## 2024-04-12 RX ORDER — ATORVASTATIN CALCIUM 80 MG/1
80 TABLET, FILM COATED ORAL DAILY
Qty: 90 TABLET | Refills: 2 | Status: SHIPPED | OUTPATIENT
Start: 2024-04-12

## 2024-04-12 NOTE — LETTER
April 13, 2024      Thierno Kendall   [address] [place] [id number]        Dear ,    We are writing to inform you of your test results.    Your test results fall within the expected range(s) or remain unchanged from previous results.  Please continue with current treatment plan.    Resulted Orders   Comprehensive metabolic panel   Result Value Ref Range    Sodium 139 135 - 145 mmol/L      Comment:      Reference intervals for this test were updated on 09/26/2023 to more accurately reflect our healthy population. There may be differences in the flagging of prior results with similar values performed with this method. Interpretation of those prior results can be made in the context of the updated reference intervals.     Potassium 4.2 3.4 - 5.3 mmol/L    Carbon Dioxide (CO2) 25 22 - 29 mmol/L    Anion Gap 11 7 - 15 mmol/L    Urea Nitrogen 13.1 8.0 - 23.0 mg/dL    Creatinine 1.08 0.67 - 1.17 mg/dL    GFR Estimate 77 >60 mL/min/1.73m2    Calcium 9.1 8.8 - 10.2 mg/dL    Chloride 103 98 - 107 mmol/L    Glucose 125 (H) 70 - 99 mg/dL    Alkaline Phosphatase 117 40 - 150 U/L      Comment:      Reference intervals for this test were updated on 11/14/2023 to more accurately reflect our healthy population. There may be differences in the flagging of prior results with similar values performed with this method. Interpretation of those prior results can be made in the context of the updated reference intervals.    AST 18 0 - 45 U/L      Comment:      Reference intervals for this test were updated on 6/12/2023 to more accurately reflect our healthy population. There may be differences in the flagging of prior results with similar values performed with this method. Interpretation of those prior results can be made in the context of the updated reference intervals.    ALT 14 0 - 70 U/L      Comment:      Reference intervals for this test were updated on 6/12/2023 to more accurately reflect our healthy  population. There may be differences in the flagging of prior results with similar values performed with this method. Interpretation of those prior results can be made in the context of the updated reference intervals.      Protein Total 7.1 6.4 - 8.3 g/dL    Albumin 4.2 3.5 - 5.2 g/dL    Bilirubin Total 0.7 <=1.2 mg/dL   Lipid panel reflex to direct LDL Fasting   Result Value Ref Range    Cholesterol 176 <200 mg/dL    Triglycerides 162 (H) <150 mg/dL    Direct Measure HDL 44 >=40 mg/dL    LDL Cholesterol Calculated 100 <=100 mg/dL    Non HDL Cholesterol 132 (H) <130 mg/dL    Patient Fasting > 8hrs? Yes     Narrative    Cholesterol  Desirable:  <200 mg/dL    Triglycerides  Normal:  Less than 150 mg/dL  Borderline High:  150-199 mg/dL  High:  200-499 mg/dL  Very High:  Greater than or equal to 500 mg/dL    Direct Measure HDL  Female:  Greater than or equal to 50 mg/dL   Male:  Greater than or equal to 40 mg/dL    LDL Cholesterol  Desirable:  <100mg/dL  Above Desirable:  100-129 mg/dL   Borderline High:  130-159 mg/dL   High:  160-189 mg/dL   Very High:  >= 190 mg/dL    Non HDL Cholesterol  Desirable:  130 mg/dL  Above Desirable:  130-159 mg/dL  Borderline High:  160-189 mg/dL  High:  190-219 mg/dL  Very High:  Greater than or equal to 220 mg/dL   Hemoglobin A1c   Result Value Ref Range    Hemoglobin A1C 6.8 (H) 0.0 - 5.6 %      Comment:      Normal <5.7%   Prediabetes 5.7-6.4%    Diabetes 6.5% or higher     Note: Adopted from ADA consensus guidelines.   Albumin Random Urine Quantitative with Creat Ratio   Result Value Ref Range    Creatinine Urine mg/dL 186.0 mg/dL      Comment:      The reference ranges have not been established in urine creatinine. The results should be integrated into the clinical context for interpretation.    Albumin Urine mg/L 13.7 mg/L      Comment:      The reference ranges have not been established in urine albumin. The results should be integrated into the clinical context for  interpretation.    Albumin Urine mg/g Cr 7.37 0.00 - 17.00 mg/g Cr      Comment:      Microalbuminuria is defined as an albumin:creatinine ratio of 17 to 299 for males and 25 to 299 for females. A ratio of albumin:creatinine of 300 or higher is indicative of overt proteinuria.  Due to biologic variability, positive results should be confirmed by a second, first-morning random or 24-hour timed urine specimen. If there is discrepancy, a third specimen is recommended. When 2 out of 3 results are in the microalbuminuria range, this is evidence for incipient nephropathy and warrants increased efforts at glucose control, blood pressure control, and institution of therapy with an angiotensin-converting-enzyme (ACE) inhibitor (if the patient can tolerate it).     CBC with platelets and differential   Result Value Ref Range    WBC Count 10.3 4.0 - 11.0 10e3/uL    RBC Count 4.80 4.40 - 5.90 10e6/uL    Hemoglobin 13.1 (L) 13.3 - 17.7 g/dL    Hematocrit 40.9 40.0 - 53.0 %    MCV 85 78 - 100 fL    MCH 27.3 26.5 - 33.0 pg    MCHC 32.0 31.5 - 36.5 g/dL    RDW 13.6 10.0 - 15.0 %    Platelet Count 263 150 - 450 10e3/uL    % Neutrophils 72 %    % Lymphocytes 17 %    % Monocytes 9 %    % Eosinophils 3 %    % Basophils 0 %    % Immature Granulocytes 0 %    Absolute Neutrophils 7.4 1.6 - 8.3 10e3/uL    Absolute Lymphocytes 1.7 0.8 - 5.3 10e3/uL    Absolute Monocytes 0.9 0.0 - 1.3 10e3/uL    Absolute Eosinophils 0.3 0.0 - 0.7 10e3/uL    Absolute Basophils 0.0 0.0 - 0.2 10e3/uL    Absolute Immature Granulocytes 0.0 <=0.4 10e3/uL       If you have any questions or concerns, please call the clinic at the number listed above.       Sincerely,      Marcus Wolff MD

## 2024-04-12 NOTE — PROGRESS NOTES
"  Assessment & Plan     Type 2 diabetes mellitus with diabetic polyneuropathy, unspecified whether long term insulin use (H)  Doing much better on metformin and Mounjaro will check A1c  - CBC with Platelets & Differential  - Comprehensive metabolic panel  - Lipid panel reflex to direct LDL Fasting  - Hemoglobin A1c  - Albumin Random Urine Quantitative with Creat Ratio  - tirzepatide (MOUNJARO) 5 MG/0.5ML pen; Inject 5 mg Subcutaneous every 7 days  - metFORMIN (GLUCOPHAGE) 1000 MG tablet; Take 1 tablet (1,000 mg) by mouth 2 times daily (with meals)  - losartan (COZAAR) 25 MG tablet; Take 1 tablet (25 mg) by mouth daily  - atorvastatin (LIPITOR) 80 MG tablet; Take 1 tablet (80 mg) by mouth daily    Gout, unspecified cause, unspecified chronicity, unspecified site  No flareups    BONITA (obstructive sleep apnea)  Has CPAP    Dyslipidemia  On atorvastatin          BMI  Estimated body mass index is 31.65 kg/m  as calculated from the following:    Height as of this encounter: 1.93 m (6' 4\").    Weight as of this encounter: 117.9 kg (260 lb).             Niki Pa is a 63 year old, presenting for the following health issues: Overall doing well.  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  His wife is doing well.  He has a adult son who has autism who he and his wife care for.  Adult son has type 1 diabetes.  Still has some left ankle issues since his sprain last month.  Will follow-up if not improved in the summer  Diabetes and Hypertension (Pt here for a Diabetic and HTN recheck and fasting labwork)      4/12/2024     9:23 AM   Additional Questions   Roomed by Shawn CASTANEDA     History of Present Illness       Diabetes:   He presents for follow up of diabetes.  He is checking home blood glucose a few times a month.   He checks blood glucose before meals.  Blood glucose is never over 200 and never under 70.     He has no concerns regarding his diabetes at this time.   He is not experiencing numbness or burning in " feet, excessive thirst, blurry vision, weight changes or redness, sores or blisters on feet.           He eats 2-3 servings of fruits and vegetables daily.He consumes 0 sweetened beverage(s) daily.He exercises with enough effort to increase his heart rate 10 to 19 minutes per day.  He exercises with enough effort to increase his heart rate 3 or less days per week.   He is taking medications regularly.       Patient Active Problem List   Diagnosis    Dyslipidemia    Gout    Hypertension    Obesity    Peripheral nerve disease    Seasonal allergic rhinitis    Type 2 diabetes mellitus (H)    BONITA (obstructive sleep apnea)     Current Outpatient Medications   Medication Sig Dispense Refill    atorvastatin (LIPITOR) 80 MG tablet Take 1 tablet (80 mg) by mouth daily 90 tablet 2    blood glucose (NO BRAND SPECIFIED) lancets standard Test 3 times daily      blood glucose (NO BRAND SPECIFIED) test strip Test 3 times daily      blood glucose monitoring (NO BRAND SPECIFIED) meter device kit Test 3 times daily      cinnamon 500 MG CAPS Take 500 mg by mouth daily as needed      losartan (COZAAR) 25 MG tablet Take 1 tablet (25 mg) by mouth daily 90 tablet 3    metFORMIN (GLUCOPHAGE) 1000 MG tablet Take 1 tablet (1,000 mg) by mouth 2 times daily (with meals) 180 tablet 3    tirzepatide (MOUNJARO) 5 MG/0.5ML pen Inject 5 mg Subcutaneous every 7 days 2 mL 11    tirzepatide (MOUNJARO) 2.5 MG/0.5ML pen Inject 2.5 mg Subcutaneous every 7 days (Patient not taking: Reported on 4/12/2024) 2 mL 0     No current facility-administered medications for this visit.       Diabetes Follow-up    How often are you checking your blood sugar? A few times a month  What time of day are you checking your blood sugars (select all that apply)?  Before meals  Have you had any blood sugars above 200?  No  Have you had any blood sugars below 70?  No  What symptoms do you notice when your blood sugar is low?  None  What concerns do you have today about your  "diabetes? None   Do you have any of these symptoms? (Select all that apply)  Numbness in feet      BP Readings from Last 2 Encounters:   04/12/24 138/83   03/01/24 126/81     Hemoglobin A1C (%)   Date Value   11/03/2023 8.5 (H)   03/15/2023 7.7 (H)     LDL Cholesterol Calculated   Date Value   03/15/2023 117 mg/dL (H)   12/21/2021 107 (H)     LDL Cholesterol Direct (mg/dL)   Date Value   07/11/2018 106 (H)   05/02/2014 194 (H)             Hypertension Follow-up    Do you check your blood pressure regularly outside of the clinic? No   Are you following a low salt diet? Yes  Are your blood pressures ever more than 140 on the top number (systolic) OR more   than 90 on the bottom number (diastolic), for example 140/90? No          Review of Systems  Constitutional, HEENT, cardiovascular, pulmonary, gi and gu systems are negative, except as otherwise noted.      Objective    /83 (BP Location: Right arm, Patient Position: Sitting, Cuff Size: Adult Large)   Pulse 78   Temp 98.3  F (36.8  C) (Tympanic)   Resp 20   Ht 1.93 m (6' 4\")   Wt 117.9 kg (260 lb)   BMI 31.65 kg/m    Body mass index is 31.65 kg/m .  Physical Exam   GENERAL: alert and no distress  NECK: no adenopathy, no asymmetry, masses, or scars  RESP: lungs clear to auscultation - no rales, rhonchi or wheezes  CV: regular rate and rhythm, normal S1 S2, no S3 or S4, no murmur, click or rub, no peripheral edema  ABDOMEN: soft, nontender, no hepatosplenomegaly, no masses and bowel sounds normal  MS: no gross musculoskeletal defects noted, no edema  Diabetic foot exam: normal DP and PT pulses, no trophic changes or ulcerative lesions, and reduced sensation at midfoot            Signed Electronically by: Marcus Wolff MD    "

## 2024-04-25 ENCOUNTER — OFFICE VISIT (OUTPATIENT)
Dept: FAMILY MEDICINE | Facility: CLINIC | Age: 64
End: 2024-04-25
Payer: COMMERCIAL

## 2024-04-25 ENCOUNTER — NURSE TRIAGE (OUTPATIENT)
Dept: NURSING | Facility: CLINIC | Age: 64
End: 2024-04-25

## 2024-04-25 VITALS
SYSTOLIC BLOOD PRESSURE: 120 MMHG | WEIGHT: 257.9 LBS | TEMPERATURE: 98.3 F | HEART RATE: 93 BPM | RESPIRATION RATE: 20 BRPM | BODY MASS INDEX: 31.4 KG/M2 | HEIGHT: 76 IN | DIASTOLIC BLOOD PRESSURE: 70 MMHG | OXYGEN SATURATION: 96 %

## 2024-04-25 DIAGNOSIS — R60.0 LOWER EXTREMITY EDEMA: Primary | ICD-10-CM

## 2024-04-25 PROCEDURE — 99213 OFFICE O/P EST LOW 20 MIN: CPT | Performed by: NURSE PRACTITIONER

## 2024-04-25 PROCEDURE — 36415 COLL VENOUS BLD VENIPUNCTURE: CPT | Performed by: NURSE PRACTITIONER

## 2024-04-25 PROCEDURE — 83880 ASSAY OF NATRIURETIC PEPTIDE: CPT | Performed by: NURSE PRACTITIONER

## 2024-04-25 NOTE — PROGRESS NOTES
"  Assessment & Plan     Lower extremity edema  Differentials under consideration include dependent edema due to decreased physical activity and increased time sitting for work, venous insufficiency, CHF, less likely to consider DVT or cellulitis since he has bilateral lower extremity edema.  Less likely to consider gout since he has no pain.  The recent lab work without concerning findings.  Did check a BNP today which was within normal limits.  Considered medication side effect, he is not on amlodipine or gabapentin.  Takes ibuprofen sparingly.    Recommend compression stockings, limiting salt, ankle exercises, and elevating legs above the heart 4 times daily.  Try to get up and move around as much as possible.   If symptoms not improving in the next 1-2 weeks, recommend follow up for consideration of medication change, possibly short term diuretic could be added.    - BNP-N terminal pro; Future  - BNP-N terminal pro            BMI  Estimated body mass index is 31.39 kg/m  as calculated from the following:    Height as of this encounter: 1.93 m (6' 4\").    Weight as of this encounter: 117 kg (257 lb 14.4 oz).             Subjective   Thierno is a 63 year old, presenting for the following health issues:  Musculoskeletal Problem (Rt inside of foot swelling x 8 days)        4/25/2024     4:50 PM   Additional Questions   Roomed by KARTIK Sewell     Thierno is a pleasant 63-year-old male who presents today for right foot swelling with mild redness onset 4/13/2024.  He has some cramping and spasms in the foot which improves with rubbing/massaging.  Otherwise no pain.  Primarily concerned with swelling and associated stiffness.  He thought he may have gout., but has no pain.  He also has swelling in the left lower extremity.  States he had a left ankle injury with his dogs, which has decreased mobility.  He has been less active.    He did see his regular provider, Dr. Wolff on 4/12/2024, but did not have symptoms at that " "time.  His symptoms started a couple days after seeing Dr. Wolff.    He denies any shortness of breath, chest pain.  No new medications.  He is not taking amlodipine or gabapentin.  Without pain, less likely to consider gout.  He did take a couple ibuprofen before bed for his left foot and ankle injury, but nothing consistently.  Do not think NSAIDs are causing swelling.  He does work from home and has been in a lot of meetings recently which require sitting in front of his computer.  States he usually sleeps in bed, not in a recliner.        History of Present Illness       Diabetes:   He presents for follow up of diabetes.  He is checking home blood glucose a few times a month.   He checks blood glucose before meals.  Blood glucose is never over 200 and never under 70.  When his blood glucose is low, the patient is asymptomatic for confusion, blurred vision, lethargy and reports not feeling dizzy, shaky, or weak.   He has no concerns regarding his diabetes at this time.   He is not experiencing numbness or burning in feet, excessive thirst, blurry vision, weight changes or redness, sores or blisters on feet.           He eats 2-3 servings of fruits and vegetables daily.He consumes 0 sweetened beverage(s) daily.He exercises with enough effort to increase his heart rate 10 to 19 minutes per day.  He exercises with enough effort to increase his heart rate 3 or less days per week.   He is taking medications regularly.                 Review of Systems  Constitutional, HEENT, cardiovascular, pulmonary, gi and gu systems are negative, except as otherwise noted.      Objective    /70 (BP Location: Right arm, Patient Position: Sitting, Cuff Size: Adult Large)   Pulse 93   Temp 98.3  F (36.8  C) (Tympanic)   Resp 20   Ht 1.93 m (6' 4\")   Wt 117 kg (257 lb 14.4 oz)   SpO2 96%   BMI 31.39 kg/m    Body mass index is 31.39 kg/m .  Physical Exam  Constitutional:       General: He is not in acute distress.     " Appearance: He is obese.   HENT:      Head: Normocephalic and atraumatic.   Cardiovascular:      Rate and Rhythm: Normal rate and regular rhythm.      Pulses: Normal pulses.      Heart sounds: Normal heart sounds.   Pulmonary:      Breath sounds: No wheezing, rhonchi or rales.   Musculoskeletal:         General: No tenderness, deformity or signs of injury.      Right lower leg: Edema present.      Left lower leg: Edema present.      Comments: Pitting bilateral lower extremity edema   Skin:     General: Skin is warm and dry.      Capillary Refill: Capillary refill takes less than 2 seconds.      Findings: No bruising or erythema.   Neurological:      Mental Status: He is alert and oriented to person, place, and time.      Sensory: No sensory deficit.      Motor: No weakness.      Gait: Gait abnormal (due to left foot and ankle injury, stiffness).   Psychiatric:         Mood and Affect: Mood normal.         Behavior: Behavior normal.                    Signed Electronically by: DIMITRY Blair CNP

## 2024-04-25 NOTE — PATIENT INSTRUCTIONS
Recommend compression stockings, limiting salt, ankle exercising, and elevating legs above the heart 4 times daily.  Try to get up and move around as much as possible.

## 2024-04-25 NOTE — TELEPHONE ENCOUNTER
"Pt states \"Banged up L ankle recently.\"  Had clinic eval for this issue 3/1/2024.  \"Sprain\" was diagnosed.  Wore a walking cast and rested x one week.  Symptoms fully resolved.  \"Then re-aggravated it\" approx two weeks ago.  Now wonders about gout.  \"Little bit of swelling by the big toe.\"  Gout was discussed with PCP at time of eval 3/1/2024.    \"Now R foot is most bothersome.\"  Onset approx ten days ago.  Has tried ibuprofen, cold packs.  Foot is \"stiff\" but not painful.  \"Little swollen -> not tender.\"  \"Like a pulled-muscle feel.\"  \"Slight redness.\"  However, denies red streaks.  Again denies tenderness.  Pt suspects gout flare due to past history of gout.    Pt agrees to same-day clinic eval per triage disposition.  Warm transferred to a  for an appointment now.  Same-day OV appt successfully scheduled.    Shannan Sanders RN  [facility] Nurse Advisor     Reason for Disposition   Swollen foot  (Exceptions: Localized bump from bunions, calluses, insect bite, sting.)    Additional Information   Negative: Followed an ankle or foot injury   Negative: Toe pain is main symptom   Negative: Ankle pain is main symptom   Negative: Entire foot is cool or blue in comparison to other foot   Negative: Purple or black skin on foot or toe   Negative: Red area or streak and fever   Negative: Swollen foot and fever   Negative: Patient sounds very sick or weak to the triager   Negative: SEVERE pain (e.g., excruciating, unable to do any normal activities)   Negative: Looks like a boil, infected sore, deep ulcer, or other infected rash (spreading redness, pus)    Protocols used: Foot Pain-A-[facility]    "

## 2024-04-26 LAB — NT-PROBNP SERPL-MCNC: 63 PG/ML (ref 0–900)

## 2024-04-29 ENCOUNTER — TELEPHONE (OUTPATIENT)
Dept: FAMILY MEDICINE | Facility: CLINIC | Age: 64
End: 2024-04-29
Payer: COMMERCIAL

## 2024-04-29 NOTE — TELEPHONE ENCOUNTER
Pt calling regarding lab results. Results relayed. Answered questions. Pt denied other concerns.    Aarceli Miller RN

## 2024-05-03 ENCOUNTER — ANCILLARY PROCEDURE (OUTPATIENT)
Dept: GENERAL RADIOLOGY | Facility: CLINIC | Age: 64
End: 2024-05-03
Attending: FAMILY MEDICINE
Payer: COMMERCIAL

## 2024-05-03 ENCOUNTER — OFFICE VISIT (OUTPATIENT)
Dept: FAMILY MEDICINE | Facility: CLINIC | Age: 64
End: 2024-05-03
Payer: COMMERCIAL

## 2024-05-03 VITALS
BODY MASS INDEX: 30.91 KG/M2 | HEART RATE: 108 BPM | DIASTOLIC BLOOD PRESSURE: 76 MMHG | TEMPERATURE: 98.8 F | HEIGHT: 76 IN | SYSTOLIC BLOOD PRESSURE: 104 MMHG | RESPIRATION RATE: 16 BRPM | OXYGEN SATURATION: 98 % | WEIGHT: 253.8 LBS

## 2024-05-03 DIAGNOSIS — M23.91 INTERNAL DERANGEMENT OF KNEE, RIGHT: ICD-10-CM

## 2024-05-03 DIAGNOSIS — Z11.4 SCREENING FOR HIV (HUMAN IMMUNODEFICIENCY VIRUS): Primary | ICD-10-CM

## 2024-05-03 DIAGNOSIS — Z11.59 NEED FOR HEPATITIS C SCREENING TEST: ICD-10-CM

## 2024-05-03 PROCEDURE — 73562 X-RAY EXAM OF KNEE 3: CPT | Mod: TC | Performed by: RADIOLOGY

## 2024-05-03 PROCEDURE — 99213 OFFICE O/P EST LOW 20 MIN: CPT | Performed by: FAMILY MEDICINE

## 2024-05-03 NOTE — PROGRESS NOTES
"  Assessment & Plan         Internal derangement of knee, right  With internal derangement knee suspect cartilaginous tear will get MRI and plain films.  He has had a cane use in the meantime  - MR Knee Right w/o Contrast; Future  - XR Knee Right 3 Views; Future            BMI  Estimated body mass index is 30.89 kg/m  as calculated from the following:    Height as of this encounter: 1.93 m (6' 4\").    Weight as of this encounter: 115.1 kg (253 lb 12.8 oz).             Niki Pa is a 63 year old, presenting for the following health issues: Patient had his dogs ran into the lateral aspect of his right knee a week ago it and immediately popped and he was very sore and tender and it swelled way up.  He notes a slow improvement this allowed swelling he can walk gingerly.  Cannot really twist or turn.  Still has restricted range of motion  Knee Pain (Right knee pain x 1 week - Initial soreness due to overcompensation for left ankle sprain, then hurt when dogs ran into knee. Reports \"popping\" noises.)      5/3/2024     1:46 PM   Additional Questions   Roomed by Rosemarie     Knee Pain    History of Present Illness       Reason for visit:  Right knee   He is taking medications regularly.                 Review of Systems  Constitutional, HEENT, cardiovascular, pulmonary, gi and gu systems are negative, except as otherwise noted.      Objective    /76 (BP Location: Right arm, Patient Position: Sitting, Cuff Size: Adult Large)   Pulse 108   Temp 98.8  F (37.1  C) (Tympanic)   Resp 16   Ht 1.93 m (6' 4\")   Wt 115.1 kg (253 lb 12.8 oz)   SpO2 98%   BMI 30.89 kg/m    Body mass index is 30.89 kg/m .  Physical Exam   Alert no apparent stress right knee reveals about 90 degrees of flexion full extension moderate-sized knee effusion or popliteal tenderness noted.  No obvious instability with difficult because of the swelling and pain proximal distal CMS intact            Signed Electronically by: Marcus " MD New

## 2024-05-16 ENCOUNTER — TRANSFERRED RECORDS (OUTPATIENT)
Dept: HEALTH INFORMATION MANAGEMENT | Facility: CLINIC | Age: 64
End: 2024-05-16
Payer: COMMERCIAL

## 2025-02-11 ENCOUNTER — TELEPHONE (OUTPATIENT)
Dept: FAMILY MEDICINE | Facility: CLINIC | Age: 65
End: 2025-02-11
Payer: COMMERCIAL

## 2025-02-11 DIAGNOSIS — E11.42 TYPE 2 DIABETES MELLITUS WITH DIABETIC POLYNEUROPATHY, UNSPECIFIED WHETHER LONG TERM INSULIN USE (H): ICD-10-CM

## 2025-02-13 RX ORDER — ATORVASTATIN CALCIUM 80 MG/1
80 TABLET, FILM COATED ORAL DAILY
Qty: 90 TABLET | Refills: 0 | Status: SHIPPED | OUTPATIENT
Start: 2025-02-13 | End: 2025-02-19

## 2025-02-13 NOTE — TELEPHONE ENCOUNTER
Please contact patient and assist with scheduling appointment to establish care with new PCP.    A short supply of medication was sent to pharmacy to cover until appointment.

## 2025-02-19 ENCOUNTER — OFFICE VISIT (OUTPATIENT)
Dept: FAMILY MEDICINE | Facility: CLINIC | Age: 65
End: 2025-02-19
Payer: COMMERCIAL

## 2025-02-19 VITALS
BODY MASS INDEX: 33.79 KG/M2 | DIASTOLIC BLOOD PRESSURE: 70 MMHG | TEMPERATURE: 97.2 F | WEIGHT: 277.5 LBS | HEART RATE: 71 BPM | SYSTOLIC BLOOD PRESSURE: 120 MMHG | OXYGEN SATURATION: 98 % | HEIGHT: 76 IN | RESPIRATION RATE: 18 BRPM

## 2025-02-19 DIAGNOSIS — Z12.5 SCREENING FOR PROSTATE CANCER: ICD-10-CM

## 2025-02-19 DIAGNOSIS — G47.33 OSA (OBSTRUCTIVE SLEEP APNEA): ICD-10-CM

## 2025-02-19 DIAGNOSIS — E11.42 TYPE 2 DIABETES MELLITUS WITH DIABETIC POLYNEUROPATHY, UNSPECIFIED WHETHER LONG TERM INSULIN USE (H): Primary | ICD-10-CM

## 2025-02-19 DIAGNOSIS — E78.5 DYSLIPIDEMIA: ICD-10-CM

## 2025-02-19 DIAGNOSIS — Z00.00 ANNUAL PHYSICAL EXAM: ICD-10-CM

## 2025-02-19 PROCEDURE — G2211 COMPLEX E/M VISIT ADD ON: HCPCS | Performed by: PHYSICIAN ASSISTANT

## 2025-02-19 PROCEDURE — 99214 OFFICE O/P EST MOD 30 MIN: CPT | Mod: 25 | Performed by: PHYSICIAN ASSISTANT

## 2025-02-19 PROCEDURE — 90673 RIV3 VACCINE NO PRESERV IM: CPT | Performed by: PHYSICIAN ASSISTANT

## 2025-02-19 PROCEDURE — 91320 SARSCV2 VAC 30MCG TRS-SUC IM: CPT | Performed by: PHYSICIAN ASSISTANT

## 2025-02-19 PROCEDURE — 90480 ADMN SARSCOV2 VAC 1/ONLY CMP: CPT | Performed by: PHYSICIAN ASSISTANT

## 2025-02-19 PROCEDURE — 99396 PREV VISIT EST AGE 40-64: CPT | Mod: 25 | Performed by: PHYSICIAN ASSISTANT

## 2025-02-19 PROCEDURE — 90471 IMMUNIZATION ADMIN: CPT | Performed by: PHYSICIAN ASSISTANT

## 2025-02-19 RX ORDER — LOSARTAN POTASSIUM 25 MG/1
25 TABLET ORAL DAILY
Qty: 90 TABLET | Refills: 3 | Status: SHIPPED | OUTPATIENT
Start: 2025-02-19

## 2025-02-19 RX ORDER — ATORVASTATIN CALCIUM 80 MG/1
80 TABLET, FILM COATED ORAL DAILY
Qty: 90 TABLET | Refills: 3 | Status: SHIPPED | OUTPATIENT
Start: 2025-02-19

## 2025-02-19 SDOH — HEALTH STABILITY: PHYSICAL HEALTH: ON AVERAGE, HOW MANY MINUTES DO YOU ENGAGE IN EXERCISE AT THIS LEVEL?: 20 MIN

## 2025-02-19 SDOH — HEALTH STABILITY: PHYSICAL HEALTH: ON AVERAGE, HOW MANY DAYS PER WEEK DO YOU ENGAGE IN MODERATE TO STRENUOUS EXERCISE (LIKE A BRISK WALK)?: 2 DAYS

## 2025-02-19 ASSESSMENT — SOCIAL DETERMINANTS OF HEALTH (SDOH): HOW OFTEN DO YOU GET TOGETHER WITH FRIENDS OR RELATIVES?: NEVER

## 2025-02-19 NOTE — PROGRESS NOTES
"Preventive Care Visit  [facility]  NEETU Sena, Family Medicine  Feb 19, 2025      Assessment & Plan     (E11.42) Type 2 diabetes mellitus with diabetic polyneuropathy, unspecified whether long term insulin use (H)  (primary encounter diagnosis)  Comment: Controlled  Plan: metFORMIN (GLUCOPHAGE) 1000 MG tablet, losartan        (COZAAR) 25 MG tablet, atorvastatin (LIPITOR)         80 MG tablet, Albumin Random Urine Quantitative        with Creat Ratio        Continue current treatment plan to return for labs    (G47.33) BONITA (obstructive sleep apnea)  Comment: Stable  Plan: Comprehensive metabolic panel (BMP + Alb, Alk         Phos, ALT, AST, Total. Bili, TP), Basic         metabolic panel        Continue with CPAP    (E78.5) Dyslipidemia  Comment: Will return for labs  Plan: Lipid panel reflex to direct LDL Fasting,         Comprehensive metabolic panel (BMP + Alb, Alk         Phos, ALT, AST, Total. Bili, TP), Basic         metabolic panel        Adjust meds as needed    (Z00.00) Annual physical exam  Comment: Diseases as noted above  Plan: Adult Eye  Referral, Lipid panel         reflex to direct LDL Fasting, Comprehensive         metabolic panel (BMP + Alb, Alk Phos, ALT, AST,        Total. Bili, TP), Basic metabolic panel        Return for fasting labs    (Z12.5) Screening for prostate cancer  Comment: Request PSA  Plan: Comprehensive metabolic panel (BMP + Alb, Alk         Phos, ALT, AST, Total. Bili, TP), PSA, screen        Will return and get this when he gets the rest of his fasting labs            BMI  Estimated body mass index is 33.78 kg/m  as calculated from the following:    Height as of this encounter: 1.93 m (6' 4\").    Weight as of this encounter: 125.9 kg (277 lb 8 oz).       Counseling  Appropriate preventive services were addressed with this patient via screening, questionnaire, or discussion as appropriate for fall prevention, nutrition, physical " activity, Tobacco-use cessation, social engagement, weight loss and cognition.  Checklist reviewing preventive services available has been given to the patient.  Reviewed patient's diet, addressing concerns and/or questions.   He is at risk for lack of exercise and has been provided with information to increase physical activity for the benefit of his well-being.   Patient is at risk for social isolation and has been provided with information about the benefit of social connection.           Niki Pa is a 64 year old, presenting for the following:  Physical        2/19/2025     3:41 PM   Additional Questions   Roomed by TONYA Branch          Patient here for annual exam  He is a former patient of Dr. Wolff  He works for [employer]  He has type 2 diabetes hyperlipidemia  Insurance would not cover the Mounjaro when he could not afford out-of-pocket  He did a home A1c in its mid sevens  He would like to get back near 7 or below  His knee is gradually getting better he did see Ortho this past summer  His left ankle pain has resolved  He tolerates his other medications without difficulty  He has no chest pain or shortness of breath  Return for fasting labs  He wants to update his immunizations  He had lost a significant amount of weight on the Mounjaro he has put it back on now that he is off              Health Care Directive  Patient does not have a Health Care Directive:       2/19/2025   General Health   How would you rate your overall physical health? Good   Feel stress (tense, anxious, or unable to sleep) Not at all         2/19/2025   Nutrition   Three or more servings of calcium each day? Yes   Diet: Regular (no restrictions)   How many servings of fruit and vegetables per day? (!) 2-3   How many sweetened beverages each day? 0-1         2/19/2025   Exercise   Days per week of moderate/strenous exercise 2 days   Average minutes spent exercising at this level 20 min   (!) EXERCISE CONCERN       "2/19/2025   Social Factors   Frequency of gathering with friends or relatives Never   Worry food won't last until get money to buy more Patient declined   Food not last or not have enough money for food? Patient declined   Do you have housing? (Housing is defined as stable permanent housing and does not include staying ouside in a car, in a tent, in an abandoned building, in an overnight shelter, or couch-surfing.) Patient declined   Are you worried about losing your housing? Patient declined   Lack of transportation? Patient declined   Unable to get utilities (heat,electricity)? Patient declined   (!) SOCIAL CONNECTIONS CONCERN      2/19/2025   Fall Risk   Fallen 2 or more times in the past year? No   Trouble with walking or balance? No          2/19/2025   Dental   Dentist two times every year? Yes            Today's PHQ-2 Score:       2/19/2025     3:33 PM   PHQ-2 ( 1999 [employer])   Q1: Little interest or pleasure in doing things 0   Q2: Feeling down, depressed or hopeless 0   PHQ-2 Score 0    Q1: Little interest or pleasure in doing things Not at all   Q2: Feeling down, depressed or hopeless Not at all   PHQ-2 Score 0       Patient-reported           2/19/2025   Substance Use   Alcohol more than 3/day or more than 7/wk No   Do you use any other substances recreationally? No     Social History     Tobacco Use    Smoking status: Never     Passive exposure: Never    Smokeless tobacco: Never   Vaping Use    Vaping status: Never Used   Substance Use Topics    Alcohol use: Yes     Comment: couple beers a year             2/19/2025   One time HIV Screening   Previous HIV test? Yes         2/19/2025   STI Screening   New sexual partner(s) since last STI/HIV test? No   Last PSA: No results found for: \"PSA\"  ASCVD Risk   The 10-year ASCVD risk score (Claudio MARINELLI, et al., 2019) is: 22.1%    Values used to calculate the score:      Age: 64 years      Sex: Male      Is Non- : No      Diabetic: " "Yes      Tobacco smoker: No      Systolic Blood Pressure: 120 mmHg      Is BP treated: Yes      HDL Cholesterol: 44 mg/dL      Total Cholesterol: 176 mg/dL           Reviewed and updated as needed this visit by Provider                             Objective    Exam  /70 (BP Location: Right arm, Patient Position: Sitting, Cuff Size: Adult Large)   Pulse 71   Temp 97.2  F (36.2  C) (Tympanic)   Resp 18   Ht 1.93 m (6' 4\")   Wt 125.9 kg (277 lb 8 oz)   SpO2 98%   BMI 33.78 kg/m     Estimated body mass index is 33.78 kg/m  as calculated from the following:    Height as of this encounter: 1.93 m (6' 4\").    Weight as of this encounter: 125.9 kg (277 lb 8 oz).    Physical Exam  Vitals and nursing note reviewed.   Constitutional:       Appearance: Normal appearance. He is obese.   HENT:      Head: Normocephalic and atraumatic.      Right Ear: Tympanic membrane normal.      Left Ear: Tympanic membrane normal.      Nose: Nose normal. No congestion or rhinorrhea.      Mouth/Throat:      Mouth: Mucous membranes are moist.   Eyes:      Conjunctiva/sclera: Conjunctivae normal.   Cardiovascular:      Rate and Rhythm: Normal rate and regular rhythm.      Pulses: Normal pulses.      Heart sounds: Normal heart sounds.   Pulmonary:      Effort: Pulmonary effort is normal.      Breath sounds: Normal breath sounds.   Abdominal:      General: Abdomen is flat. Bowel sounds are normal.      Palpations: There is no mass.      Tenderness: There is no abdominal tenderness.   Musculoskeletal:         General: Normal range of motion.      Cervical back: Normal range of motion and neck supple.      Right lower leg: No edema.      Left lower leg: No edema.   Lymphadenopathy:      Cervical: No cervical adenopathy.   Skin:     General: Skin is warm and dry.   Neurological:      General: No focal deficit present.   Psychiatric:         Mood and Affect: Mood normal.         Behavior: Behavior normal.         Thought Content: Thought " content normal.         Judgment: Judgment normal.               Signed Electronically by: NEETU Sena

## 2025-07-15 ENCOUNTER — TELEPHONE (OUTPATIENT)
Dept: FAMILY MEDICINE | Facility: CLINIC | Age: 65
End: 2025-07-15
Payer: COMMERCIAL

## 2025-07-15 NOTE — TELEPHONE ENCOUNTER
Pt calling to start back on Mounjaro. Last took Feb/March '24.     Setup an appt to see Arturo Gamble, this Friday, 7/18/25.

## 2025-07-25 ENCOUNTER — MEDICAL CORRESPONDENCE (OUTPATIENT)
Dept: HEALTH INFORMATION MANAGEMENT | Facility: CLINIC | Age: 65
End: 2025-07-25
Payer: COMMERCIAL

## 2025-07-31 ENCOUNTER — TELEPHONE (OUTPATIENT)
Dept: FAMILY MEDICINE | Facility: CLINIC | Age: 65
End: 2025-07-31
Payer: COMMERCIAL

## 2025-07-31 NOTE — TELEPHONE ENCOUNTER
Prior Authorization Approval    Medication: MOUNJARO 2.5 MG/0.5ML SC SOAJ  Authorization Effective Date:  6/30/2025  Authorization Expiration Date:  7/30/2026  Approved Dose/Quantity:   Reference #:     Insurance Company: [employer] - Phone [phone] Fax [phone]  Expected CoPay: $    CoPay Card Available:      Financial Assistance Needed:   Which Pharmacy is filling the prescription: [facility] - [address]  Pharmacy Notified: YES  Patient Notified: Instructed pharmacy to notify patient when script is ready to pick-up/ship

## 2025-08-08 ENCOUNTER — TELEPHONE (OUTPATIENT)
Dept: FAMILY MEDICINE | Facility: CLINIC | Age: 65
End: 2025-08-08
Payer: COMMERCIAL